# Patient Record
Sex: MALE | Race: WHITE | NOT HISPANIC OR LATINO | Employment: FULL TIME | URBAN - METROPOLITAN AREA
[De-identification: names, ages, dates, MRNs, and addresses within clinical notes are randomized per-mention and may not be internally consistent; named-entity substitution may affect disease eponyms.]

---

## 2018-01-29 ENCOUNTER — OFFICE VISIT - HEALTHEAST (OUTPATIENT)
Dept: FAMILY MEDICINE | Facility: CLINIC | Age: 48
End: 2018-01-29

## 2018-01-29 DIAGNOSIS — K50.90 REGIONAL ENTERITIS (H): ICD-10-CM

## 2018-01-29 DIAGNOSIS — Z01.818 PREOPERATIVE EXAMINATION: ICD-10-CM

## 2018-01-29 DIAGNOSIS — S86.812A PATELLAR TENDON RUPTURE, LEFT, INITIAL ENCOUNTER: ICD-10-CM

## 2018-01-29 DIAGNOSIS — I45.6 ANOMALOUS ATRIOVENTRICULAR EXCITATION: ICD-10-CM

## 2018-01-29 LAB — HGB BLD-MCNC: 14.5 G/DL (ref 14–18)

## 2018-01-29 ASSESSMENT — MIFFLIN-ST. JEOR: SCORE: 1763.04

## 2018-02-12 ENCOUNTER — OFFICE VISIT - HEALTHEAST (OUTPATIENT)
Dept: FAMILY MEDICINE | Facility: CLINIC | Age: 48
End: 2018-02-12

## 2018-02-12 DIAGNOSIS — Z00.00 ROUTINE GENERAL MEDICAL EXAMINATION AT A HEALTH CARE FACILITY: ICD-10-CM

## 2018-02-12 DIAGNOSIS — I45.6 ANOMALOUS ATRIOVENTRICULAR EXCITATION: ICD-10-CM

## 2018-02-12 DIAGNOSIS — K50.90 CROHN'S DISEASE (H): ICD-10-CM

## 2018-02-12 LAB
CHOLEST SERPL-MCNC: 228 MG/DL
FASTING STATUS PATIENT QL REPORTED: YES
FASTING STATUS PATIENT QL REPORTED: YES
GLUCOSE BLD-MCNC: 88 MG/DL (ref 70–125)
HDLC SERPL-MCNC: 56 MG/DL
LDLC SERPL CALC-MCNC: 135 MG/DL
TRIGL SERPL-MCNC: 184 MG/DL

## 2018-02-12 ASSESSMENT — MIFFLIN-ST. JEOR: SCORE: 1785.72

## 2018-02-18 ENCOUNTER — COMMUNICATION - HEALTHEAST (OUTPATIENT)
Dept: FAMILY MEDICINE | Facility: CLINIC | Age: 48
End: 2018-02-18

## 2018-02-22 ENCOUNTER — HOSPITAL ENCOUNTER (OUTPATIENT)
Dept: CARDIOLOGY | Facility: CLINIC | Age: 48
Discharge: HOME | End: 2018-02-22
Attending: FAMILY MEDICINE

## 2018-02-22 DIAGNOSIS — I45.6 ANOMALOUS ATRIOVENTRICULAR EXCITATION: ICD-10-CM

## 2018-02-22 LAB
AORTIC ROOT: 3.6 CM
BSA FOR ECHO PROCEDURE: 2.11 M2
CV BLOOD PRESSURE: NORMAL MMHG
CV ECHO HEIGHT: 71 IN
CV ECHO WEIGHT: 197 LBS
DOP CALC LVOT AREA: 3.46 CM2
DOP CALC LVOT DIAMETER: 2.1 CM
DOP CALC LVOT PEAK VEL: 101 CM/S
DOP CALC LVOT STROKE VOLUME: 70.3 CM3
DOP CALCLVOT PEAK VEL VTI: 20.3 CM
EJECTION FRACTION: 65 % (ref 55–75)
FRACTIONAL SHORTENING: 35.3 % (ref 28–44)
INTERVENTRICULAR SEPTUM IN END DIASTOLE: 0.9 CM (ref 0.6–1)
IVS/PW RATIO: 1
LA AREA 1: 12.5 CM2
LA AREA 2: 13.3 CM2
LEFT ATRIUM LENGTH: 4.22 CM
LEFT ATRIUM SIZE: 4 CM
LEFT ATRIUM TO AORTIC ROOT RATIO: 1.08 NO UNITS
LEFT ATRIUM VOLUME INDEX: 15.9 ML/M2
LEFT ATRIUM VOLUME: 33.5 ML
LEFT VENTRICLE CARDIAC INDEX: 2 L/MIN/M2
LEFT VENTRICLE CARDIAC OUTPUT: 4.2 L/MIN
LEFT VENTRICLE DIASTOLIC VOLUME INDEX: 37.9 CM3/M2 (ref 34–74)
LEFT VENTRICLE DIASTOLIC VOLUME: 80 CM3 (ref 62–150)
LEFT VENTRICLE HEART RATE: 60 BPM
LEFT VENTRICLE MASS INDEX: 52.4 G/M2
LEFT VENTRICLE SYSTOLIC VOLUME INDEX: 13.3 CM3/M2 (ref 11–31)
LEFT VENTRICLE SYSTOLIC VOLUME: 28 CM3 (ref 21–61)
LEFT VENTRICULAR INTERNAL DIMENSION IN DIASTOLE: 4.02 CM (ref 4.2–5.8)
LEFT VENTRICULAR INTERNAL DIMENSION IN SYSTOLE: 2.6 CM (ref 2.5–4)
LEFT VENTRICULAR MASS: 110.6 G
LEFT VENTRICULAR OUTFLOW TRACT MEAN GRADIENT: 2 MMHG
LEFT VENTRICULAR OUTFLOW TRACT MEAN VELOCITY: 60.7 CM/S
LEFT VENTRICULAR OUTFLOW TRACT PEAK GRADIENT: 4 MMHG
LEFT VENTRICULAR POSTERIOR WALL IN END DIASTOLE: 0.9 CM (ref 0.6–1)
LV STROKE VOLUME INDEX: 33.3 ML/M2
MITRAL VALVE E/A RATIO: 1.3
MV AVERAGE E/E' RATIO: 5.5 CM/S
MV DECELERATION TIME: 370 MS
MV E'TISSUE VEL-LAT: 11 CM/S
MV E'TISSUE VEL-MED: 8.58 CM/S
MV LATERAL E/E' RATIO: 4.9
MV MEDIAL E/E' RATIO: 6.3
MV PEAK A VELOCITY: 40.5 CM/S
MV PEAK E VELOCITY: 53.8 CM/S
NUC REST DIASTOLIC VOLUME INDEX: 3152 LBS
NUC REST SYSTOLIC VOLUME INDEX: 71 IN
TRICUSPID VALVE ANULAR PLANE SYSTOLIC EXCURSION: 2.8 CM

## 2018-02-22 ASSESSMENT — MIFFLIN-ST. JEOR: SCORE: 1785.72

## 2018-02-26 ENCOUNTER — COMMUNICATION - HEALTHEAST (OUTPATIENT)
Dept: FAMILY MEDICINE | Facility: CLINIC | Age: 48
End: 2018-02-26

## 2018-03-08 ENCOUNTER — COMMUNICATION - HEALTHEAST (OUTPATIENT)
Dept: FAMILY MEDICINE | Facility: CLINIC | Age: 48
End: 2018-03-08

## 2018-06-21 ENCOUNTER — RECORDS - HEALTHEAST (OUTPATIENT)
Dept: ADMINISTRATIVE | Facility: OTHER | Age: 48
End: 2018-06-21

## 2018-10-05 ENCOUNTER — RECORDS - HEALTHEAST (OUTPATIENT)
Dept: ADMINISTRATIVE | Facility: OTHER | Age: 48
End: 2018-10-05

## 2020-02-04 ENCOUNTER — RECORDS - HEALTHEAST (OUTPATIENT)
Dept: ADMINISTRATIVE | Facility: OTHER | Age: 50
End: 2020-02-04

## 2020-02-13 ENCOUNTER — OFFICE VISIT - HEALTHEAST (OUTPATIENT)
Dept: FAMILY MEDICINE | Facility: CLINIC | Age: 50
End: 2020-02-13

## 2020-02-13 DIAGNOSIS — H61.23 BILATERAL IMPACTED CERUMEN: ICD-10-CM

## 2020-02-13 DIAGNOSIS — H60.391 OTHER INFECTIVE ACUTE OTITIS EXTERNA OF RIGHT EAR: ICD-10-CM

## 2020-02-13 RX ORDER — IBUPROFEN 200 MG
200 TABLET ORAL EVERY 6 HOURS PRN
Status: ON HOLD | COMMUNITY
Start: 2020-02-13 | End: 2023-07-21

## 2020-02-18 ENCOUNTER — OFFICE VISIT - HEALTHEAST (OUTPATIENT)
Dept: FAMILY MEDICINE | Facility: CLINIC | Age: 50
End: 2020-02-18

## 2020-02-18 DIAGNOSIS — H65.04 RECURRENT ACUTE SEROUS OTITIS MEDIA OF RIGHT EAR: ICD-10-CM

## 2020-02-18 DIAGNOSIS — H69.91 DYSFUNCTION OF RIGHT EUSTACHIAN TUBE: ICD-10-CM

## 2020-08-18 ENCOUNTER — OFFICE VISIT - HEALTHEAST (OUTPATIENT)
Dept: FAMILY MEDICINE | Facility: CLINIC | Age: 50
End: 2020-08-18

## 2020-08-18 DIAGNOSIS — H60.312 ACUTE DIFFUSE OTITIS EXTERNA OF LEFT EAR: ICD-10-CM

## 2020-10-31 ENCOUNTER — VIRTUAL VISIT (OUTPATIENT)
Dept: FAMILY MEDICINE | Facility: OTHER | Age: 50
End: 2020-10-31

## 2020-11-01 ENCOUNTER — RECORDS - HEALTHEAST (OUTPATIENT)
Dept: ADMINISTRATIVE | Facility: OTHER | Age: 50
End: 2020-11-01

## 2020-11-02 NOTE — PROGRESS NOTES
"Date: 10/31/2020 16:51:43  Clinician: Mary Alice Shelton  Clinician NPI: [id number]  Patient: Emiliano Kelsey  Patient : 1970  Patient Address: [address] [place], [place] [id number]  Patient Phone: [phone]  Visit Protocol: URI  Patient Summary:  Emiliano is a 50 year old ( : 1970 ) male who initiated a [facility] Visit for COVID-19 (Coronavirus) evaluation and screening. When asked the question \"Please sign me up to receive news, health information and promotions. \", Emiliano responded \"Yes\".    When asked when his symptoms started, Emiliano reported that he does not have any symptoms.   He denies having recent facial or sinus surgery in the past 60 days and taking antibiotic medication in the past month.    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 In the past 14 days, Emiliano has worked or volunteered at a long term care facility, a group home, a nursing home, an assisted living, a hospital or a clinic, and a healthcare facility or a group living setting. Additional job details as reported by the patient (free text): [employer]   In the past 14 days, he has not lived in a congregate living setting.   Emiliano has had a close contact with a laboratory-confirmed COVID-19 patient in the last 14 days. He was not exposed at his work. Date Emiliano was exposed to the laboratory-confirmed COVID-19 patient: 10/26/2020   Additional information about contact with COVID-19 (Coronavirus) patient as reported by the patient (free text): , exposed to positive student   Emiliano is not living in the same household with the COVID-19 positive patient. He was in an enclosed space for greater than 15 minutes with the COVID-19 patient.   During the encounter, both of them were wearing masks.   Since 2019, Emiliano has not been diagnosed with lab-confirmed COVID-19 test and has not had upper " respiratory infection or influenza-like illness.   Pertinent medical history  Emiliano does not need a return to work/school note.   Weight: 195 lbs   Emiliano does not smoke or use smokeless tobacco.   Weight: 195 lbs    MEDICATIONS: No current medications, ALLERGIES: Tylenol-Codeine #3  Clinician Response:  Dear Emiliano,   Based on your exposure to COVID-19 (coronavirus), we would like to test you for this virus.  1. Please call [phone] to schedule your visit. Explain that you were referred by [facility] to have a COVID-19 test. Be ready [facility].   The following will serve as your written order for this COVID Test, ordered by me, for the indication of suspected COVID [Z20.828]: The test will be ordered in [facility], our electronic health record, after you are scheduled. It will show as ordered and authorized by Leif Walsh MD.  Order: COVID-19 (coronavirus) PCR for ASYMPTOMATIC EXPOSURE testing from [facility].   If you know you have had close contact with someone who tested positive, you should be quarantined for 14 days after this exposure. You should stay in quarantine for the14 days even if the covid test is negative, the optimal time to test after exposure is 5-7 days from the exposure  Quarantine means   What should I do?  For safety, it's very important to follow these rules. Do this for 14 days after the date you were last exposed to the virus..  Stay home and away from others. Don't go to school or anywhere else. Generally quarantine means staying home from work but there are some exceptions to this. Please contact your workplace.   No hugging, kissing or shaking hands.  Don't let anyone visit.  Cover your mouth and nose with a mask, tissue or washcloth to avoid spreading germs.  Wash your hands and face often. Use soap and water.  What are the symptoms of COVID-19?  The most common symptoms are cough, fever and trouble breathing. Less common symptoms include headache, body aches, fatigue  (feeling very tired), chills, sore throat, stuffy or runny nose, diarrhea (loose poop), loss of taste or smell, belly pain, and nausea or vomiting (feeling sick to your stomach or throwing up).  After 14 days, if you have still don't have symptoms, you likely don't have this virus.  If you develop symptoms, follow these guidelines.  If you're normally healthy: Please start another [facility] visit to report your symptoms. Go to [email or web address].  If you have a serious health problem (like cancer, heart failure, an organ transplant or kidney disease): Call your specialty clinic. Let them know that you might have COVID-19.  2. When it's time for your COVID test:  Stay at least 6 feet away from others. (If someone will drive you to your test, stay in the backseat, as far away from the  as you can.)  Cover your mouth and nose with a mask, tissue or washcloth.  Go straight to the testing site. Don't make any stops on the way there or back.  Please note  Caregivers in these groups are at risk for severe illness due to COVID-19:  o People 65 years and older  o People who live in a nursing home or long-term care facility  o People with chronic disease (lung, heart, cancer, diabetes, kidney, liver, immunologic)  o People who have a weakened immune system, including those who:  Are in cancer treatment  Take medicine that weakens the immune system, such as corticosteroids  Had a bone marrow or organ transplant  Have an immune deficiency  Have poorly controlled HIV or AIDS  Are obese (body mass index of 40 or higher)  Smoke regularly  Where can I get more information?   [facility] -- About COVID-19: [email or web address]  CDC -- What to Do If You're Sick: www.cdc.gov/coronavirus/2019-ncov/about/steps-when-sick.html  CDC -- Ending Home Isolation: www.cdc.gov/coronavirus/2019-ncov/hcp/disposition-in-home-patients.html  CDC -- Caring for Someone: www.cdc.gov/coronavirus/2019-ncov/if-you-are-sick/care-for-someone.html   [facility] -- Interim Guidance for Hospital Discharge to Home: www.health.[facility].mn.us/diseases/coronavirus/hcp/hospdischarge.pdf  [facility] clinical trials (COVID-19 research studies): clinicalaffairs.[facility]/n-clinical-trials  Below are the COVID-19 hotlines at the [place] Department of Health ([facility]). Interpreters are available.  For health questions: Call 931-410-2658 or 1-424.783.7475 (7 a.m. to 7 p.m.)  For questions about schools and childcare: Call 849-385-0237 or 1-323.945.2190 (7 a.m. to 7 p.m.)    Diagnosis: Contact with and (suspected) exposure to other viral communicable diseases  Diagnosis ICD: Z20.828

## 2021-03-25 ENCOUNTER — OFFICE VISIT - HEALTHEAST (OUTPATIENT)
Dept: FAMILY MEDICINE | Facility: CLINIC | Age: 51
End: 2021-03-25

## 2021-03-25 DIAGNOSIS — R10.84 ABDOMINAL PAIN, GENERALIZED: ICD-10-CM

## 2021-03-25 LAB
ALBUMIN SERPL-MCNC: 4.6 G/DL (ref 3.5–5)
ALP SERPL-CCNC: 76 U/L (ref 45–120)
ALT SERPL W P-5'-P-CCNC: 33 U/L (ref 0–45)
ANION GAP SERPL CALCULATED.3IONS-SCNC: 11 MMOL/L (ref 5–18)
AST SERPL W P-5'-P-CCNC: 22 U/L (ref 0–40)
BILIRUB SERPL-MCNC: 0.6 MG/DL (ref 0–1)
BUN SERPL-MCNC: 18 MG/DL (ref 8–22)
CALCIUM SERPL-MCNC: 9.3 MG/DL (ref 8.5–10.5)
CHLORIDE BLD-SCNC: 103 MMOL/L (ref 98–107)
CO2 SERPL-SCNC: 25 MMOL/L (ref 22–31)
CREAT SERPL-MCNC: 1.07 MG/DL (ref 0.7–1.3)
ERYTHROCYTE [DISTWIDTH] IN BLOOD BY AUTOMATED COUNT: 11.6 % (ref 11–14.5)
GFR SERPL CREATININE-BSD FRML MDRD: >60 ML/MIN/1.73M2
GLUCOSE BLD-MCNC: 120 MG/DL (ref 70–125)
HCT VFR BLD AUTO: 39.9 % (ref 40–54)
HGB BLD-MCNC: 13.9 G/DL (ref 14–18)
MCH RBC QN AUTO: 31.7 PG (ref 27–34)
MCHC RBC AUTO-ENTMCNC: 34.8 G/DL (ref 32–36)
MCV RBC AUTO: 91 FL (ref 80–100)
PLATELET # BLD AUTO: 200 THOU/UL (ref 140–440)
PMV BLD AUTO: 7.7 FL (ref 7–10)
POTASSIUM BLD-SCNC: 4.1 MMOL/L (ref 3.5–5)
PROT SERPL-MCNC: 7.4 G/DL (ref 6–8)
RBC # BLD AUTO: 4.38 MILL/UL (ref 4.4–6.2)
SODIUM SERPL-SCNC: 139 MMOL/L (ref 136–145)
WBC: 4.6 THOU/UL (ref 4–11)

## 2021-03-25 ASSESSMENT — MIFFLIN-ST. JEOR: SCORE: 1784.81

## 2021-03-29 ENCOUNTER — HOSPITAL ENCOUNTER (OUTPATIENT)
Dept: CT IMAGING | Facility: HOSPITAL | Age: 51
Discharge: HOME | End: 2021-03-29

## 2021-03-29 DIAGNOSIS — R10.84 ABDOMINAL PAIN, GENERALIZED: ICD-10-CM

## 2021-03-30 ENCOUNTER — AMBULATORY - HEALTHEAST (OUTPATIENT)
Dept: FAMILY MEDICINE | Facility: CLINIC | Age: 51
End: 2021-03-30

## 2021-03-30 ENCOUNTER — COMMUNICATION - HEALTHEAST (OUTPATIENT)
Dept: FAMILY MEDICINE | Facility: CLINIC | Age: 51
End: 2021-03-30

## 2021-03-30 ENCOUNTER — COMMUNICATION - HEALTHEAST (OUTPATIENT)
Dept: ADMINISTRATIVE | Facility: CLINIC | Age: 51
End: 2021-03-30

## 2021-03-30 DIAGNOSIS — K76.9 LIVER LESION: ICD-10-CM

## 2021-03-31 ENCOUNTER — HOSPITAL ENCOUNTER (OUTPATIENT)
Dept: ULTRASOUND IMAGING | Facility: CLINIC | Age: 51
Discharge: HOME | End: 2021-03-31

## 2021-03-31 DIAGNOSIS — K76.9 LIVER LESION: ICD-10-CM

## 2021-04-08 ENCOUNTER — RECORDS - HEALTHEAST (OUTPATIENT)
Dept: ADMINISTRATIVE | Facility: OTHER | Age: 51
End: 2021-04-08

## 2021-04-21 ENCOUNTER — RECORDS - HEALTHEAST (OUTPATIENT)
Dept: SCHEDULING | Facility: CLINIC | Age: 51
End: 2021-04-21

## 2021-04-21 ENCOUNTER — RECORDS - HEALTHEAST (OUTPATIENT)
Dept: ADMINISTRATIVE | Facility: OTHER | Age: 51
End: 2021-04-21

## 2021-04-21 DIAGNOSIS — Z71.3 DIETARY COUNSELING AND SURVEILLANCE: ICD-10-CM

## 2021-04-21 DIAGNOSIS — R03.0 ELEVATED BLOOD-PRESSURE READING WITHOUT DIAGNOSIS OF HYPERTENSION: ICD-10-CM

## 2021-04-21 DIAGNOSIS — K50.10 CROHN'S DISEASE OF LARGE INTESTINE WITHOUT COMPLICATION (H): ICD-10-CM

## 2021-04-21 DIAGNOSIS — R10.9 RIGHT SIDED ABDOMINAL PAIN: ICD-10-CM

## 2021-05-24 ENCOUNTER — RECORDS - HEALTHEAST (OUTPATIENT)
Dept: ADMINISTRATIVE | Facility: CLINIC | Age: 51
End: 2021-05-24

## 2021-05-25 ENCOUNTER — RECORDS - HEALTHEAST (OUTPATIENT)
Dept: ADMINISTRATIVE | Facility: CLINIC | Age: 51
End: 2021-05-25

## 2021-05-31 ENCOUNTER — RECORDS - HEALTHEAST (OUTPATIENT)
Dept: ADMINISTRATIVE | Facility: CLINIC | Age: 51
End: 2021-05-31

## 2021-06-01 VITALS — WEIGHT: 197 LBS | BODY MASS INDEX: 27.58 KG/M2 | HEIGHT: 71 IN

## 2021-06-01 VITALS — HEIGHT: 71 IN | BODY MASS INDEX: 26.88 KG/M2 | WEIGHT: 192 LBS

## 2021-06-01 VITALS — WEIGHT: 197 LBS | HEIGHT: 71 IN | BODY MASS INDEX: 27.58 KG/M2

## 2021-06-04 VITALS
DIASTOLIC BLOOD PRESSURE: 78 MMHG | SYSTOLIC BLOOD PRESSURE: 138 MMHG | WEIGHT: 200 LBS | BODY MASS INDEX: 27.89 KG/M2 | HEART RATE: 66 BPM | RESPIRATION RATE: 17 BRPM | OXYGEN SATURATION: 96 % | TEMPERATURE: 97.7 F

## 2021-06-04 VITALS
DIASTOLIC BLOOD PRESSURE: 68 MMHG | TEMPERATURE: 97.5 F | SYSTOLIC BLOOD PRESSURE: 120 MMHG | BODY MASS INDEX: 28.22 KG/M2 | HEART RATE: 86 BPM | WEIGHT: 202.3 LBS | OXYGEN SATURATION: 98 %

## 2021-06-04 VITALS
SYSTOLIC BLOOD PRESSURE: 130 MMHG | DIASTOLIC BLOOD PRESSURE: 80 MMHG | HEART RATE: 72 BPM | OXYGEN SATURATION: 98 % | BODY MASS INDEX: 27.48 KG/M2 | TEMPERATURE: 98.3 F | RESPIRATION RATE: 16 BRPM | WEIGHT: 197 LBS

## 2021-06-05 VITALS
HEIGHT: 71 IN | SYSTOLIC BLOOD PRESSURE: 112 MMHG | DIASTOLIC BLOOD PRESSURE: 72 MMHG | WEIGHT: 196.8 LBS | BODY MASS INDEX: 27.55 KG/M2 | HEART RATE: 72 BPM

## 2021-06-06 NOTE — PROGRESS NOTES
Assessment/Plan:    1. Recurrent acute serous otitis media of right ear  2.  Eustachian tube dysfunction, right  Patient's ear canal appears to be healing.  He still has significant moderate pain internally in his right ear with some discomfort radiating down into his right side of his jaw.  He has some erythema of the TM otherwise, no overt sign of infection today.  Patient had some relief after finishing amoxicillin but symptoms have since worsened again.  In light of persisting symptoms along with upcoming travel, will provide prescription for cefdinir 300 mg twice daily for 10 days.  We discussed taking a decongestant such as Sudafed to help relieve pressure in the ear.  I will place a referral for ENT in the event that symptoms do not improve with second round of oral antibiotics.  We discussed red flag symptoms that would warrant prompt evaluation.  - cefdinir (OMNICEF) 300 MG capsule; Take 1 capsule (300 mg total) by mouth 2 (two) times a day for 10 days.  Dispense: 20 capsule; Refill: 0  -Ambulatory referral to ENT      Subjective:    Emiliano Kelsey is a 49 year old male seen today for follow up of recent [facility] visit.  Patient had issues with right-sided ear pain for the last 3 weeks or so.  [facility] end of January, 2019.  He reports having symptoms of sore throat, cough and fever at that time.  A few days later he developed right-sided ear pain.  He was treated with amoxicillin and ofloxacin eardrops.  States that symptoms seem to be getting better initially however after finishing antibiotic symptoms started to take a turn for the worse again.  He then was evaluated in walk-in care last week.  Was prescribed Ciprodex eardrops for management of external ear infection.  Patient states that he has not noticed much improvement since starting these eardrops.  He feels that the pain is more internal and is starting to have some discomfort radiating down to the right side of his  jaw.  He denies any persistent fevers.  No drainage from the right ear.  His left ear is asymptomatic.  Patient states long history of eustachian tube dysfunction in the right ear.  He has some ringing in his right ear.  No change in hearing otherwise.  Has a very difficult time with flying.  [place].  Review of systems is as stated in HPI, and the remainder of the 10 system review is otherwise unremarkable.    Past Medical History, Family History, and Social History reviewed.    Past Surgical History:   Procedure Laterality Date     GA APPENDECTOMY      Description: Appendectomy;  Recorded: 12/09/2008;     GA REMOVAL OF TONSILS,<13 Y/O      Description: Tonsillectomy;  Recorded: 12/09/2008;     GA REPAIR CRUCIATE LIGAMENT,KNEE      Description: Primary Repair Of Knee Ligament Cruciate Anterior;  Recorded: 12/09/2008;        No family history on file.     Past Medical History:   Diagnosis Date     Crohn's Disease     Created by Conversion      Opal-Parkinson-White Syndrome     Created by Conversion         Social History     Tobacco Use     Smoking status: Never Smoker     Smokeless tobacco: Never Used   Substance Use Topics     Alcohol use: Not on file     Drug use: Not on file        Current Outpatient Medications   Medication Sig Dispense Refill     ciprofloxacin-dexamethasone (CIPRODEX) otic suspension 4 drops in affected ear twice daily x 7 days 7.5 mL 0     ibuprofen (ADVIL,MOTRIN) 200 MG tablet Take 200 mg by mouth every 6 (six) hours as needed for pain.       cefdinir (OMNICEF) 300 MG capsule Take 1 capsule (300 mg total) by mouth 2 (two) times a day for 10 days. 20 capsule 0     No current facility-administered medications for this visit.           Objective:    Vitals:    02/18/20 1529   BP: 120/68   Patient Site: Right Arm   Patient Position: Sitting   Cuff Size: Adult Large   Pulse: 86   Temp: 97.5  F (36.4  C)   SpO2: 98%   Weight: 202 lb 4.8 oz  (91.8 kg)      Body mass index is 28.22 kg/m .      General Appearance:  Alert, afebrile, cooperative, no distress, appears stated age   HEENT:   Moist mucous membranes, posterior pharynx is clear without erythema or exudate.  Left tympanic membrane and ear canal appear normal.  Right ear canal is normal, right tympanic membrane is erythematous, nonbulging.  No sinus tenderness or nasal drainage present.   Neck: Supple, symmetrical, no adenopathy.   Lungs:   Clear to auscultation bilaterally, respirations unlabored.    Heart:  Regular rate and rhythm, S1, S2 normal, no murmur, rub or gallop   Skin: Warm, dry.  Skin color, texture, turgor normal, no rashes or lesions           This note has been dictated using voice recognition software. Any grammatical or context distortions are unintentional and inherent to the use of this software.

## 2021-06-10 NOTE — PROGRESS NOTES
URGENT CARE VISIT:    SUBJECTIVE:   Emiliano Kelsey is a 49 y.o. male presenting with a chief complaint of left ear pain.  Onset was 3 week(s) ago. He denies the following symptoms: fever, chills, nasal congestion, sore throat, cough and dyspnea. Course of illness is gradually worsening. Pain radiates to left jaw. Pain is constant. Treatment measures tried include none with no relief of symptoms. Predisposing factors include history of recurrent ear infections and eustachian tube dysfunction. Last time had to go on a few different antibiotics before it resolved.     PMH:   Past Medical History:   Diagnosis Date     Crohn's Disease     Created by Conversion      Opal-Parkinson-White Syndrome     Created by Conversion      Allergies: Codeine   Medications:   Current Outpatient Medications   Medication Sig Dispense Refill     ibuprofen (ADVIL,MOTRIN) 200 MG tablet Take 200 mg by mouth every 6 (six) hours as needed for pain.       amoxicillin (AMOXIL) 500 MG capsule Take 2 capsules (1,000 mg total) by mouth 3 (three) times a day for 7 days. 42 capsule 0     neomycin-polymyxin-hydrocortisone (CORTISPORIN) otic solution Administer 3 drops into the left ear 3 (three) times a day for 10 days. 10 mL 0     No current facility-administered medications for this visit.      Social History:   Social History     Tobacco Use     Smoking status: Never Smoker     Smokeless tobacco: Never Used   Substance Use Topics     Alcohol use: Not on file        ROS:  Review of systems negative except as stated above.    OBJECTIVE:  /80 (Patient Site: Right Arm, Patient Position: Sitting, Cuff Size: Adult Large)   Pulse 72   Temp 98.3  F (36.8  C) (Oral)   Resp 16   Wt 197 lb (89.4 kg)   SpO2 98%   BMI 27.48 kg/m      GENERAL APPEARANCE: healthy, alert and no distress  EYES: conjunctiva clear  HENT: TM's normal.  Left marked tragus ttp. Mild erythema of left external canal. Non-erythematous oropharynx. Uvula midline.   NECK: supple,  nontender, no lymphadenopathy  RESP: lungs clear to auscultation - no rales, rhonchi or wheezes  CV: regular rate and rhythm, normal S1 S2, no murmur noted  SKIN: no suspicious lesions or rashes    Labs:    Results for orders placed or performed during the hospital encounter of 02/22/18   Echo Complete   Result Value Ref Range    BSA 2.11 m2    Jake 71 in    Weight 3,152 lbs    /64 mmHg    HR 60 bpm    LV volume diastolic 80 62 - 150 cm3    LV volume systolic 28 21 - 61 cm3    IVSd 0.9 0.6 - 1.0 cm    LVIDd 4.02 4.2 - 5.8 cm    LVIDs 2.6 2.5 - 4.0 cm    LVOT diam 2.1 cm    LVOT mean gradient 2 mmHg    LVOT peak VTI 20.3 cm    LVOT mean radha 60.7 cm/s    LVOT peak radha 101 cm/s    LVOT peak gradient 4 mmHg    LV PWd 0.9 0.6 - 1.0 cm    MV E' lat radha 11 cm/s    MV E' med radha 8.58 cm/s    AO root 3.6 cm    LA size 4 cm    LA/AO root ratio 1.08 no units    MV decel time 370 ms    MV peak A radha 40.5 cm/s    MV peak E radha 53.8 cm/s    LA area 1 12.5 cm2    LA length 4.22 cm    LA area 2 13.3 cm2    TAPSE 2.8 cm    IVS/PW ratio 1.0     LV FS 35.3 28 - 44 %    Echo LVEF calculated 65 55 - 75 %    LA volume 33.5 mL    LV mass 110.6 g    MV E/A Ratio 1.3     LVOT area 3.46 cm2    LVOT SV 70.3 cm3    LV systolic volume index 13.3 11 - 31 cm3/m2    LV diastolic volume index 37.9 34 - 74 cm3/m2    LA volume index 15.9 mL/m2    LV mass index 52.4 g/m2    LV SVi 33.3 ml/m2    MV med E/e' ratio 6.3     MV lat E/e' ratio 4.9     LV CO 4.2 l/min    LV Ci 2.0 l/min/m2    Height 71.0 in    Weight 197 lbs    MV Avg E/e' Ratio 5.5 cm/s        ASSESSMENT:  1. Acute diffuse otitis externa of left ear  neomycin-polymyxin-hydrocortisone (CORTISPORIN) otic solution    amoxicillin (AMOXIL) 500 MG capsule       PLAN:  Patient Instructions   Patient was educated on the natural course of condition. Recurrent OE. Last episode in February was on several oral antibiotics and drops before resolution. They reccommended ENT visit at that time but  COVID hit and that fell through. Apply medication as prescribed. If no improvement in 3 days then start oral antibiotic. Conservative measures discussed including warm compresses and over-the-counter analgesics (Tylenol and Ibuprofen). See your primary care provider if symptoms worsen or do not improve in 5 days. Seek emergency care if you develop severe ear pain, swelling, or redness.     Patient verbalized understanding and is agreeable to plan. The patient was discharged ambulatory and in stable condition.    Ember Hodge ....................  8/18/2020   11:15 AM

## 2021-06-10 NOTE — PATIENT INSTRUCTIONS - HE
Patient was educated on the natural course of condition. Recurrent OE. Last episode in February was on several oral antibiotics and drops before resolution. They reccommended ENT visit at that time but COVID hit and that fell through. Apply medication as prescribed. If no improvement in 3 days then start oral antibiotic. Conservative measures discussed including warm compresses and over-the-counter analgesics (Tylenol and Ibuprofen). See your primary care provider if symptoms worsen or do not improve in 5 days. Seek emergency care if you develop severe ear pain, swelling, or redness.

## 2021-06-15 NOTE — PROGRESS NOTES
Preoperative Exam    Scheduled Procedure: revision of knee tendon repair left knee  Surgery Date:  1/31/18  Surgery Location: [facility], fax [phone]    Surgeon:  Dr Florez    Assessment/Plan:     Emiliano was seen today for pre-op exam.    Preoperative examination  -     Hemoglobin    Patellar tendon rupture, left, initial encounter    Opal-Parkinson-White Syndrome    Crohn's Disease        Surgical Procedure Risk: Low (reported cardiac risk generally < 1%)  Have you had prior anesthesia?: No  Have you or any family members had a previous anesthesia reaction:  No  Do you or any family members have a history of a clotting or bleeding disorder?: No  Cardiac Risk Assessment: opal parkinsons syndrome    Patient approved for surgery with general or local anesthesia.    Please Note:    Functional Status: Independent  Patient plans to recover at home with family.     Subjective:      Emiliano Kelsey is a 47 y.o. male who presents for a preoperative consultation.  He had a patellar tendon rupture in 2016.  Because of ongoing symptoms now requires surgical revision.  Medical history significant for Opal-Parkinson-White syndrome.  Patient states diagnosed 21 years ago.  At the time had intermittent symptoms of tachycardia that caused lightheadedness.  Was evaluated and found to have Opal-Parkinson-White.  Has not been on treatment.  No recent cardiac evaluation.  Is an active otherwise healthy adult who denies any concerning symptoms that could be traced back to his Opal-Parkinson-White.  Discussed obtaining an echocardiogram for long-term surveillance of this condition but do not feel that there is any significant consideration with this particular aspect of his health history related to his upcoming surgery.  Has a history of Crohn's disease.  Is on mesalamine.  Reports good control of symptoms with only occasional digestive system complaints that are not sustained and nothing  current.  No prior history of any significant anesthesia or surgery problems.  No history of blood clots or bleeding disorders.  Denies any symptoms of an acute infection.    All other systems reviewed and are negative, other than those listed in the HPI.    Pertinent History  Do you have difficulty breathing or chest pain after walking up a flight of stairs: No  History of obstructive sleep apnea: No  Steroid use in the last 6 months:   Frequent Aspirin/NSAID use: No  Prior Blood Transfusion: No  Prior Blood Transfusion Reaction:   If for some reason prior to, during or after the procedure, if it is medically indicated, would you be willing to have a blood transfusion?:      Current Outpatient Prescriptions   Medication Sig Dispense Refill     mesalamine (LIALDA) 1.2 gram EC tablet Take 1,200 mg by mouth daily with breakfast.       ASACOL  mg TbEC EC tablet        No current facility-administered medications for this visit.         Allergies   Allergen Reactions     Codeine      Dyspepsia         Patient Active Problem List   Diagnosis     Opal-Parkinson-White Syndrome     Crohn's Disease     Allergies       Past Medical History:   Diagnosis Date     Crohn's Disease     Created by Conversion      Opal-Parkinson-White Syndrome     Created by Conversion        Past Surgical History:   Procedure Laterality Date     AR APPENDECTOMY      Description: Appendectomy;  Recorded: 12/09/2008;     AR REMOVAL OF TONSILS,<13 Y/O      Description: Tonsillectomy;  Recorded: 12/09/2008;     AR REPAIR CRUCIATE LIGAMENT,KNEE      Description: Primary Repair Of Knee Ligament Cruciate Anterior;  Recorded: 12/09/2008;       Social History     Social History     Marital status:      Spouse name: N/A     Number of children: N/A     Years of education: N/A     Occupational History     Not on file.     Social History Main Topics     Smoking status: Never Smoker     Smokeless tobacco: Never Used     Alcohol use Not on file      "Drug use: Not on file     Sexual activity: Not on file     Other Topics Concern     Not on file     Social History Narrative       Patient Care Team:  Floyd Johnson MD as PCP - General (Family Medicine)  Anselmo Florez MD as Physician (Orthopedic Surgery)  Surgeon : DR Florez          Objective:     Vitals:    01/29/18 0819   BP: 112/66   Pulse: 62   SpO2: 98%   Weight: 192 lb (87.1 kg)   Height: 5' 11\" (1.803 m)         Physical Exam:    General Appearance:    Alert, cooperative, no distress   Eyes:    No conjunctival irritation, no scleral icterus       Ears:    Normal TM's and external ear canals, both ears   Throat:   Lips, mucosa, and tongue normal; teeth and gums normal   Neck:   Supple, symmetrical, trachea midline, no adenopathy;        thyroid:  No enlargement/tenderness/nodules   Lungs:     Clear to auscultation bilaterally, respirations unlabored   Heart:    Regular rate and rhythm,  no murmur, rub or gallop   Abdomen:     Soft, non-tender, bowel sounds active all four quadrants,     no masses, no organomegaly   Extremities:   Extremities normal, atraumatic, no cyanosis or edema   Skin:   Skin color, texture, turgor normal, no rashes or lesions   Neurologic:   Normal strength and sensation        There are no Patient Instructions on file for this visit.      Immunization History   Administered Date(s) Administered     DT (pediatric) 06/01/2004     Influenza [id number], 10/30/2009     Influenza, inj, historic,unspecified 10/06/2015, 10/01/2017     Influenza, seasonal,quad inj 36+ mos 10/06/2015     PPD Test 10/06/2015     Pneumo Conj 13-V (2010&after) 03/22/2017     Pneumo Polysac 23-V 01/27/2015     Td, Adult, Absorbed 06/01/2004     Td,adult,historic,unspecified 06/01/2004     Tdap 09/30/2015       Electronically signed by Floyd Johnson MD 01/29/18 7:50 AM  "

## 2021-06-16 NOTE — PROGRESS NOTES
"    Assessment & Plan     Abdominal pain, generalized  Unclear etiology.  There does not appear to be anything emergent going on.  I do not think this is related to his underlying Crohn disease.  CT scan to evaluate for possible hernia or other issue  - CT Abdomen Pelvis Without Oral With Without IV Contrast  - Comprehensive Metabolic Panel  - HM2(CBC w/o Differential)    Patient Instructions   I think it is reasonable to proceed with a CT scan to better understand your abdominal pain.    Basic lab work today.    Follow-up with GI doctor as planned.    Sign up for my chart so we can relay results to you.  If there is something concerning, I will call.    Follow-up with PCP in 3 to 6 months for routine checkup        15 minutes spent on the date of the encounter doing chart review, history and exam, documentation and further activities as noted above       BMI:   Estimated body mass index is 27.45 kg/m  as calculated from the following:    Height as of this encounter: 5' 11\" (1.803 m).    Weight as of this encounter: 196 lb 12.8 oz (89.3 kg).       Return in about 6 months (around 9/25/2021).    Feliberto Pang, [facility]    Subjective   Emiliano Kelsey is 50 y.o. and presents today for the following health issues   HPI   Persistent periumbilical abdominal pain since January, bit about 2.5 months now.  Pain is constant.  Described as a dull ache.  Persistent 2 out of 10.  Worse with palpation to the area.    He is worried about a possible hernia although no prior history of hernias.  At times she will have some right lower quadrant/groin pain although he has had an appendectomy    History of Crohn disease but that has been stable for a few years.  Has follow-up with his GI doctor in about 2 weeks.  No longer using medication for that.    No nausea or vomiting.  No fevers.  Eating and drinking normally.      Review of Systems  negative      Objective    /72 (Patient Site: " "Right Arm, Patient Position: Sitting, Cuff Size: Adult Large)   Pulse 72   Ht 5' 11\" (1.803 m)   Wt 196 lb 12.8 oz (89.3 kg)   BMI 27.45 kg/m    Body mass index is 27.45 kg/m .  Physical Exam  Pain with palpation to the periumbilical area, mostly on the right side.    No hepatosplenomegaly.    Negative inguinal hernia exam bilaterally              "

## 2021-06-16 NOTE — PATIENT INSTRUCTIONS - HE
I think it is reasonable to proceed with a CT scan to better understand your abdominal pain.    Basic lab work today.    Follow-up with GI doctor as planned.    Sign up for my chart so we can relay results to you.  If there is something concerning, I will call.    Follow-up with PCP in 3 to 6 months for routine checkup

## 2021-06-16 NOTE — TELEPHONE ENCOUNTER
Please call the patient and let him know that the CT scan of his abdomen did not reveal any hernias.  However, he does have some lesions on his liver that should be further evaluated with an ultrasound.  I placed an order for the abdominal ultrasound.  Please give him the radiology scheduling number.

## 2021-06-16 NOTE — PROGRESS NOTES
" Patient ID: Emiliano Kelsey is a 47 y.o. male.  /64  Pulse 68  Ht 5' 11\" (1.803 m)  Wt 197 lb (89.4 kg)  SpO2 97%  BMI 27.48 kg/m2    Assessment/Plan:                   Diagnoses and all orders for this visit:    Routine general medical examination at a health care facility  -     Lipid Cascade  -     Glucose; Future  -     Glucose    Opal-Parkinson-White Syndrome    Crohn's disease           DISCUSSION  Obtain fasting labs as noted above.  Referral to cardiology obtain an echocardiogram prior to visit.  Continue to follow with gastroenterology.  Subjective:     HPI    Emiliano Kelsey is a 47-year-old man who is here today for a physical.  He was recently seen for preoperative evaluation at the end of January.  He underwent a arthroscopic procedure on his left knee.  He continues to recover and will start physical therapy tomorrow.  No problems with his surgery.  His medical history significant for diagnosis of Opal-Parkinson-White.  Had experienced a tachyarrhythmia that may have been atrial flutter at the time of his diagnosis.  There are no records to review regarding the details of the diagnosis otherwise.  Patient reports that he has had intermittent bouts of tachyarrhythmia which she has been able to abort himself by using vagal maneuvers.  Reports he still occasionally will get such symptoms however they are short-lived and infrequent and has not prompted any specific medical evaluation.  He has not seen a cardiologist or had a cardiology evaluation for a number of years.  Today we discussed arranging for routine cardiology follow-up to see if there is anything more that should be done in terms of evaluation.  He does also have a history of Crohn's disease is on mesalamine.  Undergoes periodic colonoscopies about every 2-3 years.  His Crohn's disease seems to be in remission and does not report any concerning symptoms.  He is otherwise healthy.  Social and family history are reviewed.  He remains " "physically active.  He does  high school sports including track.  Works as a paramedic.  Does not smoke.  Otherwise overall doing well.      Review of Systems  Complete review of systems is obtained.  Other than the specific considerations noted above complete review of systems is negative.          Objective:   Medications:  Current Outpatient Prescriptions   Medication Sig Note     mesalamine (LIALDA) 1.2 gram EC tablet Take 1,200 mg by mouth daily with breakfast.      ASACOL  mg TbEC EC tablet  10/6/2015: Received from: External Pharmacy       Allergies:  Allergies   Allergen Reactions     Codeine      Dyspepsia         Tobacco:   reports that he has never smoked. He has never used smokeless tobacco.    HEALTH PREVENTION    General  Dental care: Discussed the importance of regular dental care.  Eye care: Discussed importance of routine eye exams for glaucoma screening      Wt Readings from Last 3 Encounters:   02/12/18 197 lb (89.4 kg)   01/29/18 192 lb (87.1 kg)   05/26/16 186 lb (84.4 kg)     Body mass index is 27.48 kg/(m^2).      Cholesterol:   YNo results found for: LDLCALC   Blood Pressure:   BP Readings from Last 3 Encounters:   02/12/18 114/64   01/29/18 112/66   05/26/16 112/66     Immunization History   Administered Date(s) Administered     DT (pediatric) 06/01/2004     Influenza [id number], 10/30/2009     Influenza, inj, historic,unspecified 10/06/2015, 10/01/2017     Influenza, seasonal,quad inj 36+ mos 10/06/2015     PPD Test 10/06/2015     Pneumo Conj 13-V (2010&after) 03/22/2017     Pneumo Polysac 23-V 01/27/2015     Td, Adult, Absorbed 06/01/2004     Td,adult,historic,unspecified 06/01/2004     Tdap 09/30/2015     There are no preventive care reminders to display for this patient.     Physical Exam      /64  Pulse 68  Ht 5' 11\" (1.803 m)  Wt 197 lb (89.4 kg)  SpO2 97%  BMI 27.48 kg/m2    General Appearance:    Alert, cooperative, no distress, appears stated age   Head:    " Normocephalic, without obvious abnormality, atraumatic   Eyes:    PERRL, conjunctiva/corneas clear, EOM's intact       Ears:    Normal TM's and external ear canals, both ears   Nose:   Nares normal, septum midline, mucosa normal, no drainage    or sinus tenderness   Throat:   Lips, mucosa, and tongue normal; teeth and gums normal   Neck:   Supple, symmetrical, trachea midline, no adenopathy;        thyroid:  No enlargement/tenderness/nodules   Lungs:     Clear to auscultation bilaterally, respirations unlabored   Chest wall/ Breast:    No tenderness or deformity   Heart:    Regular rate and rhythm, S1 and S2 normal, no murmur, rub   or gallop   Abdomen:     Soft, non-tender, bowel sounds active all four quadrants,     no masses, no organomegaly   Extremities:   Extremities normal, atraumatic, no cyanosis or edema   Pulses:   2+ and symmetric all extremities   Skin:   Skin color, texture, turgor normal, no rashes or lesions   Neurologic:   CNII-XII intact. Normal strength, sensation and reflexes       throughout

## 2021-06-16 NOTE — TELEPHONE ENCOUNTER
Reason for Call:  Request for results:    Name of test or procedure: CT ABDOMEN    Date of test of procedure: 03/29/2021    Location of the test or procedure: [facility]    OK to leave the result message on voice mail or with a family member? 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 PLEASE CALL WITH NEXT STEPS.     Call taken on 3/30/2021 at 8:09 AM by Mary Glover

## 2021-06-16 NOTE — TELEPHONE ENCOUNTER
Left message to call back for: Emiliano     Information to relay to patient:   Relayed Tez's message below and gave patient the scheduling number for radiology.      Keke Boyce, CMA

## 2021-06-18 NOTE — PATIENT INSTRUCTIONS - HE
Patient Instructions by Jess Lira MD at 2/13/2020  3:00 PM     Author: Jess Lira MD Service: -- Author Type: Physician    Filed: 2/13/2020  4:08 PM Encounter Date: 2/13/2020 Status: Addendum    : Jess Lira MD (Physician)    Related Notes: Original Note by Jess Lira MD (Physician) filed at 2/13/2020  4:07 PM       1. Make an appointment for follow up with primary care provider  2. If symptoms are getting worse over time or you have any questions, call the clinic number - it's answered 24/7      Patient Education     External Ear Infection (Adult)    External otitis (also called swimmers ear) is an infection in the ear canal. It is often caused by bacteria or fungus. It can occur a few days after water gets trapped in the ear canal (from swimming or bathing). It can also occur after cleaning too deeply in the ear canal with a cotton swab or other object. Sometimes, hair care products get into the ear canal and cause this problem.  Symptoms can include pain, fever, itching, redness, drainage, or swelling of the ear canal. Temporary hearing loss may also occur.  Home care    Do not try to clean the ear canal. This can push pus and bacteria deeper into the canal.    Use prescribed ear drops as directed. These help reduce swelling and fight the infection. If an ear wick was placed in the ear canal, apply drops right onto the end of the wick. The wick will draw the medicine into the ear canal even if it is swollen closed.    A cotton ball may be loosely placed in the outer ear to absorb any drainage.    You may use acetaminophen or ibuprofen to control pain, unless another medicine was prescribed. Note: If you have chronic liver or kidney disease or ever had a stomach ulcer or GI bleeding, talk to your healthcare provider before taking any of these medicines.    Do not allow water to get into your ear when bathing. Also, don't swim until the infection has cleared.  Prevention    Keep  your ears dry. This helps lower the risk of infection. Dry your ears with a towel or hair dryer after getting wet. Also, use ear plugs when swimming.    Do not stick any objects in the ear to remove wax.    If you feel water trapped in your ear, use ear drops right away. You can get these drops over the counter at most drugstores. They work by removing water from the ear canal.  Follow-up care  Follow up with your healthcare provider in 1 week, or as advised.  When to seek medical advice  Call your healthcare provider right away if any of these occur:    Ear pain becomes worse or doesnt improve after 3 days of treatment    Redness or swelling of the outer ear occurs or gets worse    Headache    Painful or stiff neck    Drowsiness or confusion    Fever of 100.4 F (38 C) or higher, or as directed by your healthcare provider    Seizure  Date Last Reviewed: 10/1/2017    4343-0629 [employer]. [address] [place] [id number]. All rights reserved. This information is not intended as a substitute for professional medical care. Always follow your healthcare professional's instructions.

## 2021-06-28 NOTE — PROGRESS NOTES
Progress Notes by Jess Lira MD at 2/13/2020  3:00 PM     Author: Jess Lira MD Service: -- Author Type: Physician    Filed: 2/13/2020  4:14 PM Encounter Date: 2/13/2020 Status: Signed    : Jess Lira MD (Physician)         Subjective:   Emiliano Kelsey is a 49 y.o. male  Roomed by: SHANIKA Ambriz    Refills needed? No    Do you have any forms that need to be filled out? No      Chief Complaint   Patient presents with   ? sick x 3 weeks   ? diagnose ear infection / finish antibiotic and ear drops for   ? still have Ear Pain   Says he had started getting ill on 1/30 and a temp up to 102.5. Says at that time he was coughing, nasal congestion and sore throat. Says he went to the [facility] on 2/4 for right ear infectionGiven amoxicillin for 7 days and and ofloxacin antibiotic ear drops. Says his right ear is not as painful, but still feels pain and very plugged. Says he has a history of narrow eustachian tubes. Denies any coughing, fevers, chills, CP or shortness of breath. Energy level has been back to normal and has been working. 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   Review of Systems  See HPI for ROS, otherwise balance of other systems negative    Allergies   Allergen Reactions   ? Codeine      Dyspepsia         Current Outpatient Medications:   ?  ibuprofen (ADVIL,MOTRIN) 200 MG tablet, Take 200 mg by mouth every 6 (six) hours as needed for pain., Disp: , Rfl:   Patient Active Problem List   Diagnosis   ? Opal-Parkinson-White Syndrome   ? Crohn's Disease   ? Allergies   ? Crohn's disease (H)     Past Medical History:   Diagnosis Date   ? Crohn's Disease     Created by Conversion    ? Opal-Parkinson-White Syndrome     Created by Conversion     - if none on file, see Problem List    Objective:     Vitals:    02/13/20 1507 02/13/20 1511   BP: 148/77 138/78   Pulse: 66    Resp: 17    Temp: 97.7  F (36.5  C)    TempSrc: Oral     SpO2: 96%    Weight: 200 lb (90.7 kg)    Gen - Pt in NAD  Eyes - Conjunctiva non injected, no drainage  Face - non TTP over frontal sinus areas; non TTP over maxillary areas  Ears - Right external canals -very indurated with TTP with tugging on right ear lobe  Left external ear canal is without any induration;  Both TM's mostly occluded 2nd to cerumen   Nose - not congested, no nasal drainage  Pharynx - not injected, tonsils 1+ size  Neck - supple, no cervical adenopathy, no masses  Cor - RRR w/o murmur  Lungs - Good air entry; no wheezes or crackles noted on auscultation - no coughing noted  Skin - no lesions, no rashes noted     Assessment - Plan   Medical Decision Making -phoned patient's pharmacy and was informed that his initial eardrop was ofloxacin.  Was able to visualize both TMs after ear irrigation and both TMs are noninjected.  Will treat with Ciprodex for 7 days and have patient follow-up on with primary on 2/18 for another ear check.    1. Other infective acute otitis externa of right ear  - ciprofloxacin-dexamethasone (CIPRODEX) otic suspension; 4 drops in affected ear twice daily x 7 days  Dispense: 7.5 mL; Refill: 0    2. Bilateral impacted cerumen  - Ear wax removal      At the conclusion of the encounter, assessment and plan were discussed.   All questions were answered.   The patient or guardian acknowledged understanding and was involved in the decision making regarding the overall care plan.    Patient Instructions   1. Make an appointment for follow up with primary care provider  2. If symptoms are getting worse over time or you have any questions, call the clinic number - it's answered 24/7      Patient Education     External Ear Infection (Adult)    External otitis (also called swimmers ear) is an infection in the ear canal. It is often caused by bacteria or fungus. It can occur a few days after water gets trapped in the ear canal (from swimming or bathing). It can also occur after cleaning  too deeply in the ear canal with a cotton swab or other object. Sometimes, hair care products get into the ear canal and cause this problem.  Symptoms can include pain, fever, itching, redness, drainage, or swelling of the ear canal. Temporary hearing loss may also occur.  Home care    Do not try to clean the ear canal. This can push pus and bacteria deeper into the canal.    Use prescribed ear drops as directed. These help reduce swelling and fight the infection. If an ear wick was placed in the ear canal, apply drops right onto the end of the wick. The wick will draw the medicine into the ear canal even if it is swollen closed.    A cotton ball may be loosely placed in the outer ear to absorb any drainage.    You may use acetaminophen or ibuprofen to control pain, unless another medicine was prescribed. Note: If you have chronic liver or kidney disease or ever had a stomach ulcer or GI bleeding, talk to your healthcare provider before taking any of these medicines.    Do not allow water to get into your ear when bathing. Also, don't swim until the infection has cleared.  Prevention    Keep your ears dry. This helps lower the risk of infection. Dry your ears with a towel or hair dryer after getting wet. Also, use ear plugs when swimming.    Do not stick any objects in the ear to remove wax.    If you feel water trapped in your ear, use ear drops right away. You can get these drops over the counter at most drugstores. They work by removing water from the ear canal.  Follow-up care  Follow up with your healthcare provider in 1 week, or as advised.  When to seek medical advice  Call your healthcare provider right away if any of these occur:    Ear pain becomes worse or doesnt improve after 3 days of treatment    Redness or swelling of the outer ear occurs or gets worse    Headache    Painful or stiff neck    Drowsiness or confusion    Fever of 100.4 F (38 C) or higher, or as directed by your healthcare  provider    Seizure  Date Last Reviewed: 10/1/2017    0254-3039 [employer]. [address] [place] [address], [place] [address]. All rights reserved. This information is not intended as a substitute for professional medical care. Always follow your healthcare professional's instructions.

## 2021-07-25 ENCOUNTER — HEALTH MAINTENANCE LETTER (OUTPATIENT)
Age: 51
End: 2021-07-25

## 2021-09-19 ENCOUNTER — HEALTH MAINTENANCE LETTER (OUTPATIENT)
Age: 51
End: 2021-09-19

## 2021-12-14 ENCOUNTER — APPOINTMENT (OUTPATIENT)
Dept: FAMILY MEDICINE | Facility: CLINIC | Age: 51
End: 2021-12-14

## 2021-12-14 ENCOUNTER — LAB REQUISITION (OUTPATIENT)
Dept: LAB | Facility: CLINIC | Age: 51
End: 2021-12-14

## 2021-12-14 LAB — SARS-COV-2 RNA RESP QL NAA+PROBE: POSITIVE

## 2021-12-14 PROCEDURE — U0003 INFECTIOUS AGENT DETECTION BY NUCLEIC ACID (DNA OR RNA); SEVERE ACUTE RESPIRATORY SYNDROME CORONAVIRUS 2 (SARS-COV-2) (CORONAVIRUS DISEASE [COVID-19]), AMPLIFIED PROBE TECHNIQUE, MAKING USE OF HIGH THROUGHPUT TECHNOLOGIES AS DESCRIBED BY CMS-2020-01-R: HCPCS | Performed by: INTERNAL MEDICINE

## 2022-08-21 ENCOUNTER — HEALTH MAINTENANCE LETTER (OUTPATIENT)
Age: 52
End: 2022-08-21

## 2022-11-21 ENCOUNTER — HEALTH MAINTENANCE LETTER (OUTPATIENT)
Age: 52
End: 2022-11-21

## 2023-05-22 ENCOUNTER — TRANSFERRED RECORDS (OUTPATIENT)
Dept: HEALTH INFORMATION MANAGEMENT | Facility: CLINIC | Age: 53
End: 2023-05-22

## 2023-06-01 ENCOUNTER — TRANSFERRED RECORDS (OUTPATIENT)
Dept: HEALTH INFORMATION MANAGEMENT | Facility: CLINIC | Age: 53
End: 2023-06-01

## 2023-06-05 ENCOUNTER — TRANSFERRED RECORDS (OUTPATIENT)
Dept: HEALTH INFORMATION MANAGEMENT | Facility: CLINIC | Age: 53
End: 2023-06-05

## 2023-06-08 ENCOUNTER — TRANSFERRED RECORDS (OUTPATIENT)
Dept: HEALTH INFORMATION MANAGEMENT | Facility: CLINIC | Age: 53
End: 2023-06-08

## 2023-06-21 ENCOUNTER — TRANSFERRED RECORDS (OUTPATIENT)
Dept: HEALTH INFORMATION MANAGEMENT | Facility: CLINIC | Age: 53
End: 2023-06-21

## 2023-07-06 ENCOUNTER — TRANSFERRED RECORDS (OUTPATIENT)
Dept: HEALTH INFORMATION MANAGEMENT | Facility: CLINIC | Age: 53
End: 2023-07-06
Payer: COMMERCIAL

## 2023-07-10 PROBLEM — K29.80 DUODENITIS: Status: ACTIVE | Noted: 2021-04-21

## 2023-07-10 PROBLEM — R10.13 EPIGASTRIC PAIN: Status: ACTIVE | Noted: 2021-04-19

## 2023-07-10 PROBLEM — R10.9 RIGHT SIDED ABDOMINAL PAIN: Status: ACTIVE | Noted: 2023-07-10

## 2023-07-10 PROBLEM — R10.9 NONSPECIFIC ABDOMINAL PAIN: Status: ACTIVE | Noted: 2021-04-08

## 2023-07-11 ENCOUNTER — OFFICE VISIT (OUTPATIENT)
Dept: FAMILY MEDICINE | Facility: CLINIC | Age: 53
End: 2023-07-11
Payer: COMMERCIAL

## 2023-07-11 VITALS
BODY MASS INDEX: 25.48 KG/M2 | DIASTOLIC BLOOD PRESSURE: 76 MMHG | OXYGEN SATURATION: 100 % | SYSTOLIC BLOOD PRESSURE: 120 MMHG | HEART RATE: 76 BPM | TEMPERATURE: 98.4 F | HEIGHT: 71 IN | WEIGHT: 182 LBS | RESPIRATION RATE: 12 BRPM

## 2023-07-11 DIAGNOSIS — D64.9 ANEMIA, UNSPECIFIED TYPE: ICD-10-CM

## 2023-07-11 DIAGNOSIS — M51.27 HERNIATION OF INTERVERTEBRAL DISC BETWEEN L5 AND S1: ICD-10-CM

## 2023-07-11 DIAGNOSIS — K50.919 CROHN'S DISEASE WITH COMPLICATION, UNSPECIFIED GASTROINTESTINAL TRACT LOCATION (H): ICD-10-CM

## 2023-07-11 DIAGNOSIS — I45.6 ANOMALOUS ATRIOVENTRICULAR EXCITATION: ICD-10-CM

## 2023-07-11 DIAGNOSIS — Z01.818 PREOP GENERAL PHYSICAL EXAM: Primary | ICD-10-CM

## 2023-07-11 LAB
ANION GAP SERPL CALCULATED.3IONS-SCNC: 10 MMOL/L (ref 7–15)
BUN SERPL-MCNC: 26.9 MG/DL (ref 6–20)
CALCIUM SERPL-MCNC: 9.5 MG/DL (ref 8.6–10)
CHLORIDE SERPL-SCNC: 99 MMOL/L (ref 98–107)
CREAT SERPL-MCNC: 0.96 MG/DL (ref 0.67–1.17)
DEPRECATED HCO3 PLAS-SCNC: 28 MMOL/L (ref 22–29)
ERYTHROCYTE [DISTWIDTH] IN BLOOD BY AUTOMATED COUNT: 12.4 % (ref 10–15)
GFR SERPL CREATININE-BSD FRML MDRD: >90 ML/MIN/1.73M2
GLUCOSE SERPL-MCNC: 92 MG/DL (ref 70–99)
HCT VFR BLD AUTO: 36.6 % (ref 40–53)
HGB BLD-MCNC: 12.4 G/DL (ref 13.3–17.7)
MCH RBC QN AUTO: 31.6 PG (ref 26.5–33)
MCHC RBC AUTO-ENTMCNC: 33.9 G/DL (ref 31.5–36.5)
MCV RBC AUTO: 93 FL (ref 78–100)
PLATELET # BLD AUTO: 225 10E3/UL (ref 150–450)
POTASSIUM SERPL-SCNC: 4.2 MMOL/L (ref 3.4–5.3)
RBC # BLD AUTO: 3.93 10E6/UL (ref 4.4–5.9)
SODIUM SERPL-SCNC: 137 MMOL/L (ref 136–145)
WBC # BLD AUTO: 6.5 10E3/UL (ref 4–11)

## 2023-07-11 PROCEDURE — 99214 OFFICE O/P EST MOD 30 MIN: CPT | Performed by: PHYSICIAN ASSISTANT

## 2023-07-11 PROCEDURE — 85027 COMPLETE CBC AUTOMATED: CPT | Performed by: PHYSICIAN ASSISTANT

## 2023-07-11 PROCEDURE — 83540 ASSAY OF IRON: CPT | Performed by: PHYSICIAN ASSISTANT

## 2023-07-11 PROCEDURE — 82607 VITAMIN B-12: CPT | Performed by: PHYSICIAN ASSISTANT

## 2023-07-11 PROCEDURE — 80048 BASIC METABOLIC PNL TOTAL CA: CPT | Performed by: PHYSICIAN ASSISTANT

## 2023-07-11 PROCEDURE — 93005 ELECTROCARDIOGRAM TRACING: CPT | Performed by: PHYSICIAN ASSISTANT

## 2023-07-11 PROCEDURE — 82728 ASSAY OF FERRITIN: CPT | Performed by: PHYSICIAN ASSISTANT

## 2023-07-11 PROCEDURE — 83550 IRON BINDING TEST: CPT | Performed by: PHYSICIAN ASSISTANT

## 2023-07-11 PROCEDURE — 36415 COLL VENOUS BLD VENIPUNCTURE: CPT | Performed by: PHYSICIAN ASSISTANT

## 2023-07-11 RX ORDER — CYCLOBENZAPRINE HCL 5 MG
5 TABLET ORAL 3 TIMES DAILY PRN
COMMUNITY
Start: 2023-07-11

## 2023-07-11 RX ORDER — OXYCODONE HYDROCHLORIDE 5 MG/1
5 TABLET ORAL EVERY 6 HOURS PRN
Qty: 6 TABLET | Refills: 0 | COMMUNITY
Start: 2023-07-11 | End: 2023-07-18

## 2023-07-11 ASSESSMENT — ENCOUNTER SYMPTOMS
JOINT SWELLING: 0
HEARTBURN: 0
ARTHRALGIAS: 0
SORE THROAT: 0
PARESTHESIAS: 0
HEADACHES: 0
HEMATURIA: 0
SHORTNESS OF BREATH: 0
BACK PAIN: 1
EYE PAIN: 0
COUGH: 0
DYSURIA: 0
PALPITATIONS: 0
NERVOUS/ANXIOUS: 0
WEAKNESS: 0
MYALGIAS: 0
DIZZINESS: 0
DIARRHEA: 0
HEMATOCHEZIA: 0
NAUSEA: 0
FEVER: 0
CONSTIPATION: 0
CHILLS: 0
ABDOMINAL PAIN: 0
FREQUENCY: 0

## 2023-07-11 ASSESSMENT — PATIENT HEALTH QUESTIONNAIRE - PHQ9
SUM OF ALL RESPONSES TO PHQ QUESTIONS 1-9: 4
SUM OF ALL RESPONSES TO PHQ QUESTIONS 1-9: 4
10. IF YOU CHECKED OFF ANY PROBLEMS, HOW DIFFICULT HAVE THESE PROBLEMS MADE IT FOR YOU TO DO YOUR WORK, TAKE CARE OF THINGS AT HOME, OR GET ALONG WITH OTHER PEOPLE: NOT DIFFICULT AT ALL

## 2023-07-11 NOTE — H&P (VIEW-ONLY)
[facility]  [address]  [place]  [facility] [address]  Phone: [phone]  Fax: [phone]  Primary Provider: Floyd Johnson  Pre-op Performing Provider: SAKSHI HUGO    PREOPERATIVE EVALUATION:  Today's date: 7/11/2023    Emiliano Kelsey is a 52 year old male who presents for a preoperative evaluation.      7/11/2023     1:51 PM   Additional Questions   Roomed by Kalee Amaya   Accompanied by none     Surgical Information:  Surgery/Procedure: Repair of L5, S! buldging disc  Surgery Location: [facility]  Surgeon: Dr. Alejandro  Surgery Date: 7/21/23  Time of Surgery: TBD  Where patient plans to recover: At home with family  Fax number for surgical facility: [phone]    Assessment & Plan     The proposed surgical procedure is considered LOW risk.    Preop general physical exam  Herniation of intervertebral disc between L5 and S1  Emiliano is a 52-year-old male who presents to the clinic today for preop physical.  He will be undergoing a L5-S1 herniated disc repair on July 21, 2022 with Dr. Alejandro.  He is feeling well and at his baseline.  He denies any chest pain, shortness of breath, lightheaded or dizziness.  We will update CBC, BMP and an EKG.  He is currently taking oxycodone and Flexeril prescribed by orthopedics.  - CBC with platelets; Future  - Basic metabolic panel; Future  - EKG 12-lead, tracing only    Opal-Parkinson-White Syndrome  Asymptomatic at this time.  Diagnosed in college.  He has not had any symptoms recently.  He has seen cardiology in the past.  He is not currently on any beta-blockers.  We will check an EKG today.  - EKG 12-lead, tracing only    Crohn's disease with complication, unspecified gastrointestinal tract location (H)  Currently in remission.  He is not on any immunotherapy at this time.  Symptoms are well controlled.    Anemia  Chronic anemia. Will check CBC, Iron, Ferritin, and B12.      - No  identified additional risk factors other than previously addressed    Antiplatelet or Anticoagulation Medication Instructions:   - Patient is on no antiplatelet or anticoagulation medications.    Additional Medication Instructions:  Hold all NSAIDs, herbal supplements, and multivitamins 7 days prior to the procedure    RECOMMENDATION:  APPROVAL GIVEN to proceed with proposed procedure, without further diagnostic evaluation.    6}    Subjective       HPI related to upcoming procedure: Emiliano is a pleasant 52-year-old male who presents to the clinic today for preop physical.  He will be undergoing a L5-S1 herniated disc repair on July 21, 2023 with Dr. Alejandro.  He developed lower back pain in May.  He did trial a steroid injection and physical therapy with minimal to no improvement.  He then underwent an MRI which showed a herniated disc L5-S1.  Past medical history significant for Opal-Parkinson-White syndrome and Crohn's disease.  He is feeling well and at his baseline.  He denies any chest pain, shortness of breath, lightheaded or dizziness.  No abdominal pains.  No nausea vomiting or constipation.        7/11/2023     1:41 PM   Preop Questions   1. Have you ever had a heart attack or stroke? No   2. Have you ever had surgery on your heart or blood vessels, such as a stent placement, a coronary artery bypass, or surgery on an artery in your head, neck, heart, or legs? No   3. Do you have chest pain with activity? No   4. Do you have a history of  heart failure? No   5. Do you currently have a cold, bronchitis or symptoms of other infection? No   6. Do you have a cough, shortness of breath, or wheezing? No   7. Do you or anyone in your family have previous history of blood clots? No   8. Do you or does anyone in your family have a serious bleeding problem such as prolonged bleeding following surgeries or cuts? No   9. Have you ever had problems with anemia or been told to take iron pills? No   10. Have you had any  abnormal blood loss such as black, tarry or bloody stools? No   11. Have you ever had a blood transfusion? No   12. Are you willing to have a blood transfusion if it is medically needed before, during, or after your surgery? Yes   13. Have you or any of your relatives ever had problems with anesthesia? No   14. Do you have sleep apnea, excessive snoring or daytime drowsiness? No   15. Do you have any artifical heart valves or other implanted medical devices like a pacemaker, defibrillator, or continuous glucose monitor? No   16. Do you have artificial joints? No   17. Are you allergic to latex? No       Health Care Directive:  Patient does not have a Health Care Directive or Living Will: Discussed advance care planning with patient; however, patient declined at this time.    Preoperative Review of :   reviewed - no record of controlled substances prescribed.          Review of Systems   Constitutional: Negative for chills and fever.   HENT: Negative for congestion, ear pain, hearing loss and sore throat.    Eyes: Negative for pain and visual disturbance.   Respiratory: Negative for cough and shortness of breath.    Cardiovascular: Negative for chest pain, palpitations and peripheral edema.   Gastrointestinal: Negative for abdominal pain, constipation, diarrhea, heartburn, hematochezia and nausea.   Genitourinary: Negative for dysuria, frequency, genital sores, hematuria, impotence, penile discharge and urgency.   Musculoskeletal: Positive for back pain. Negative for arthralgias, joint swelling and myalgias.   Skin: Negative for rash.   Neurological: Negative for dizziness, weakness, headaches and paresthesias.   Psychiatric/Behavioral: Negative for mood changes. The patient is not nervous/anxious.        Patient Active Problem List    Diagnosis Date Noted     Right sided abdominal pain 07/10/2023     Priority: Medium     Duodenitis 04/21/2021     Priority: Medium     Epigastric pain 04/19/2021     Priority:  "Medium     Nonspecific abdominal pain 04/08/2021     Priority: Medium     Crohn's Disease      Priority: Medium     Created by Conversion  Replacement Utility updated for latest IMO load         Opal-Parkinson-White Syndrome      Priority: Medium     Created by Conversion         Allergies      Priority: Medium     Created by Conversion         Crohn's disease (H) 03/10/2009     Priority: Medium     Diverticular disease of colon 03/06/2009     Priority: Medium      Past Medical History:   Diagnosis Date     Anomalous atrioventricular excitation     Created by Conversion      Regional enteritis of unspecified site     Created by Conversion      Past Surgical History:   Procedure Laterality Date     [facility] REMOVAL OF TONSILS,<11 Y/O      Description: Tonsillectomy;  Recorded: 12/09/2008;     [facility] APPENDECTOMY      Description: Appendectomy;  Recorded: 12/09/2008;     [facility] REPAIR CRUCIATE LIGAMENT,KNEE      Description: Primary Repair Of Knee Ligament Cruciate Anterior;  Recorded: 12/09/2008;     Current Outpatient Medications   Medication Sig Dispense Refill     cyclobenzaprine (FLEXERIL) 5 MG tablet Take 1 tablet (5 mg) by mouth 3 times daily as needed for muscle spasms       ibuprofen (ADVIL,MOTRIN) 200 MG tablet [IBUPROFEN (ADVIL,MOTRIN) 200 MG TABLET] Take 200 mg by mouth every 6 (six) hours as needed for pain.       oxyCODONE (ROXICODONE) 5 MG tablet Take 1 tablet (5 mg) by mouth every 6 hours as needed for severe pain 6 tablet 0       Allergies   Allergen Reactions     Codeine Unknown     Dyspepsia          Social History     Tobacco Use     Smoking status: Never     Passive exposure: Never     Smokeless tobacco: Never   Substance Use Topics     Alcohol use: Not on file     No family history on file.  History   Drug Use Not on file         Objective     /76 (BP Location: Left arm, Patient Position: Sitting, Cuff Size: Adult Regular)   Pulse 76   Temp 98.4  F (36.9  C) (Oral)   Resp 12   Ht 1.803 m (5' 11\")  "  Wt 82.6 kg (182 lb)   SpO2 100%   BMI 25.38 kg/m      Physical Exam  Vitals and nursing note reviewed.   Constitutional:       General: He is not in acute distress.     Appearance: Normal appearance. He is well-developed and well-groomed. He is not ill-appearing or toxic-appearing.   HENT:      Head: Normocephalic and atraumatic.      Right Ear: Tympanic membrane, ear canal and external ear normal.      Left Ear: Tympanic membrane, ear canal and external ear normal.      Mouth/Throat:      Lips: Pink.      Mouth: Mucous membranes are moist.      Palate: No mass.      Pharynx: Oropharynx is clear. Uvula midline.      Tonsils: No tonsillar exudate or tonsillar abscesses.   Eyes:      General: Lids are normal.         Right eye: No discharge.         Left eye: No discharge.   Neck:      Trachea: Trachea normal.   Cardiovascular:      Rate and Rhythm: Normal rate and regular rhythm.      Heart sounds: S1 normal and S2 normal. No murmur heard.  Pulmonary:      Effort: Pulmonary effort is normal.      Breath sounds: Normal breath sounds and air entry.   Abdominal:      General: Bowel sounds are normal. There is no distension.      Palpations: Abdomen is soft.      Tenderness: There is no abdominal tenderness. There is no guarding or rebound.   Musculoskeletal:      Cervical back: Full passive range of motion without pain and neck supple.      Right lower leg: No edema.      Left lower leg: No edema.   Lymphadenopathy:      Cervical: No cervical adenopathy.   Skin:     General: Skin is warm and dry.      Findings: No lesion or rash.   Neurological:      General: No focal deficit present.      Mental Status: He is alert.      Cranial Nerves: No cranial nerve deficit.      Gait: Gait is intact.      Deep Tendon Reflexes:      Reflex Scores:       Patellar reflexes are 2+ on the right side and 2+ on the left side.  Psychiatric:         Attention and Perception: Attention normal.         Mood and Affect: Mood normal.          Speech: Speech normal.         Diagnostics:  Recent Results (from the past 240 hour(s))   EKG 12-lead, tracing only    Collection Time: 07/11/23  2:13 PM   Result Value Ref Range    Systolic Blood Pressure  mmHg    Diastolic Blood Pressure  mmHg    Ventricular Rate 65 BPM    Atrial Rate 65 BPM    ID Interval 140 ms    QRS Duration 86 ms     ms    QTc 418 ms    P Axis 48 degrees    R AXIS 69 degrees    T Axis 49 degrees    Interpretation ECG       Sinus rhythm  Normal ECG  When compared with ECG of 09-MAY-1997 22:59,  Vent. rate has decreased BY  44 BPM     CBC with platelets    Collection Time: 07/11/23  2:22 PM   Result Value Ref Range    WBC Count 6.5 4.0 - 11.0 10e3/uL    RBC Count 3.93 (L) 4.40 - 5.90 10e6/uL    Hemoglobin 12.4 (L) 13.3 - 17.7 g/dL    Hematocrit 36.6 (L) 40.0 - 53.0 %    MCV 93 78 - 100 fL    MCH 31.6 26.5 - 33.0 pg    MCHC 33.9 31.5 - 36.5 g/dL    RDW 12.4 10.0 - 15.0 %    Platelet Count 225 150 - 450 10e3/uL   Basic metabolic panel    Collection Time: 07/11/23  2:22 PM   Result Value Ref Range    Sodium 137 136 - 145 mmol/L    Potassium 4.2 3.4 - 5.3 mmol/L    Chloride 99 98 - 107 mmol/L    Carbon Dioxide (CO2) 28 22 - 29 mmol/L    Anion Gap 10 7 - 15 mmol/L    Urea Nitrogen 26.9 (H) 6.0 - 20.0 mg/dL    Creatinine 0.96 0.67 - 1.17 mg/dL    Calcium 9.5 8.6 - 10.0 mg/dL    Glucose 92 70 - 99 mg/dL    GFR Estimate >90 >60 mL/min/1.73m2          Revised Cardiac Risk Index (RCRI):  The patient has the following serious cardiovascular risks for perioperative complications:   - No serious cardiac risks = 0 points     RCRI Interpretation: 0 points: Class I (very low risk - 0.4% complication rate)    Vasquez Score > 4 Lucy    Signed Electronically by: Yohannes Bermudez PA-C  Copy of this evaluation report is provided to requesting physician.      Answers for HPI/ROS submitted by the patient on 7/11/2023  If you checked off any problems, how difficult have these problems made it for you to do your  work, take care of things at home, or get along with other people?: Not difficult at all  PHQ9 TOTAL SCORE: 4

## 2023-07-11 NOTE — PROGRESS NOTES
[facility]  [address]  [place]  [facility] [address]  Phone: [phone]  Fax: [phone]  Primary Provider: Floyd Johnosn  Pre-op Performing Provider: SAKSHI HUGO    PREOPERATIVE EVALUATION:  Today's date: 7/11/2023    Emiliano Kelsey is a 52 year old male who presents for a preoperative evaluation.      7/11/2023     1:51 PM   Additional Questions   Roomed by Kalee Amaya   Accompanied by none     Surgical Information:  Surgery/Procedure: Repair of L5, S! buldging disc  Surgery Location: [facility]  Surgeon: Dr. Alejandro  Surgery Date: 7/21/23  Time of Surgery: TBD  Where patient plans to recover: At home with family  Fax number for surgical facility: [phone]    Assessment & Plan     The proposed surgical procedure is considered LOW risk.    Preop general physical exam  Herniation of intervertebral disc between L5 and S1  Emiliano is a 52-year-old male who presents to the clinic today for preop physical.  He will be undergoing a L5-S1 herniated disc repair on July 21, 2022 with Dr. Alejandro.  He is feeling well and at his baseline.  He denies any chest pain, shortness of breath, lightheaded or dizziness.  We will update CBC, BMP and an EKG.  He is currently taking oxycodone and Flexeril prescribed by orthopedics.  - CBC with platelets; Future  - Basic metabolic panel; Future  - EKG 12-lead, tracing only    Opal-Parkinson-White Syndrome  Asymptomatic at this time.  Diagnosed in college.  He has not had any symptoms recently.  He has seen cardiology in the past.  He is not currently on any beta-blockers.  We will check an EKG today.  - EKG 12-lead, tracing only    Crohn's disease with complication, unspecified gastrointestinal tract location (H)  Currently in remission.  He is not on any immunotherapy at this time.  Symptoms are well controlled.    Anemia  Chronic anemia. Will check CBC, Iron, Ferritin, and B12.      - No  identified additional risk factors other than previously addressed    Antiplatelet or Anticoagulation Medication Instructions:   - Patient is on no antiplatelet or anticoagulation medications.    Additional Medication Instructions:  Hold all NSAIDs, herbal supplements, and multivitamins 7 days prior to the procedure    RECOMMENDATION:  APPROVAL GIVEN to proceed with proposed procedure, without further diagnostic evaluation.    6}    Subjective       HPI related to upcoming procedure: Emiliano is a pleasant 52-year-old male who presents to the clinic today for preop physical.  He will be undergoing a L5-S1 herniated disc repair on July 21, 2023 with Dr. Alejandro.  He developed lower back pain in May.  He did trial a steroid injection and physical therapy with minimal to no improvement.  He then underwent an MRI which showed a herniated disc L5-S1.  Past medical history significant for Opal-Parkinson-White syndrome and Crohn's disease.  He is feeling well and at his baseline.  He denies any chest pain, shortness of breath, lightheaded or dizziness.  No abdominal pains.  No nausea vomiting or constipation.        7/11/2023     1:41 PM   Preop Questions   1. Have you ever had a heart attack or stroke? No   2. Have you ever had surgery on your heart or blood vessels, such as a stent placement, a coronary artery bypass, or surgery on an artery in your head, neck, heart, or legs? No   3. Do you have chest pain with activity? No   4. Do you have a history of  heart failure? No   5. Do you currently have a cold, bronchitis or symptoms of other infection? No   6. Do you have a cough, shortness of breath, or wheezing? No   7. Do you or anyone in your family have previous history of blood clots? No   8. Do you or does anyone in your family have a serious bleeding problem such as prolonged bleeding following surgeries or cuts? No   9. Have you ever had problems with anemia or been told to take iron pills? No   10. Have you had any  abnormal blood loss such as black, tarry or bloody stools? No   11. Have you ever had a blood transfusion? No   12. Are you willing to have a blood transfusion if it is medically needed before, during, or after your surgery? Yes   13. Have you or any of your relatives ever had problems with anesthesia? No   14. Do you have sleep apnea, excessive snoring or daytime drowsiness? No   15. Do you have any artifical heart valves or other implanted medical devices like a pacemaker, defibrillator, or continuous glucose monitor? No   16. Do you have artificial joints? No   17. Are you allergic to latex? No       Health Care Directive:  Patient does not have a Health Care Directive or Living Will: Discussed advance care planning with patient; however, patient declined at this time.    Preoperative Review of :   reviewed - no record of controlled substances prescribed.          Review of Systems   Constitutional: Negative for chills and fever.   HENT: Negative for congestion, ear pain, hearing loss and sore throat.    Eyes: Negative for pain and visual disturbance.   Respiratory: Negative for cough and shortness of breath.    Cardiovascular: Negative for chest pain, palpitations and peripheral edema.   Gastrointestinal: Negative for abdominal pain, constipation, diarrhea, heartburn, hematochezia and nausea.   Genitourinary: Negative for dysuria, frequency, genital sores, hematuria, impotence, penile discharge and urgency.   Musculoskeletal: Positive for back pain. Negative for arthralgias, joint swelling and myalgias.   Skin: Negative for rash.   Neurological: Negative for dizziness, weakness, headaches and paresthesias.   Psychiatric/Behavioral: Negative for mood changes. The patient is not nervous/anxious.        Patient Active Problem List    Diagnosis Date Noted     Right sided abdominal pain 07/10/2023     Priority: Medium     Duodenitis 04/21/2021     Priority: Medium     Epigastric pain 04/19/2021     Priority:  "Medium     Nonspecific abdominal pain 04/08/2021     Priority: Medium     Crohn's Disease      Priority: Medium     Created by Conversion  Replacement Utility updated for latest IMO load         Opal-Parkinson-White Syndrome      Priority: Medium     Created by Conversion         Allergies      Priority: Medium     Created by Conversion         Crohn's disease (H) 03/10/2009     Priority: Medium     Diverticular disease of colon 03/06/2009     Priority: Medium      Past Medical History:   Diagnosis Date     Anomalous atrioventricular excitation     Created by Conversion      Regional enteritis of unspecified site     Created by Conversion      Past Surgical History:   Procedure Laterality Date     [facility] REMOVAL OF TONSILS,<11 Y/O      Description: Tonsillectomy;  Recorded: 12/09/2008;     [facility] APPENDECTOMY      Description: Appendectomy;  Recorded: 12/09/2008;     [facility] REPAIR CRUCIATE LIGAMENT,KNEE      Description: Primary Repair Of Knee Ligament Cruciate Anterior;  Recorded: 12/09/2008;     Current Outpatient Medications   Medication Sig Dispense Refill     cyclobenzaprine (FLEXERIL) 5 MG tablet Take 1 tablet (5 mg) by mouth 3 times daily as needed for muscle spasms       ibuprofen (ADVIL,MOTRIN) 200 MG tablet [IBUPROFEN (ADVIL,MOTRIN) 200 MG TABLET] Take 200 mg by mouth every 6 (six) hours as needed for pain.       oxyCODONE (ROXICODONE) 5 MG tablet Take 1 tablet (5 mg) by mouth every 6 hours as needed for severe pain 6 tablet 0       Allergies   Allergen Reactions     Codeine Unknown     Dyspepsia          Social History     Tobacco Use     Smoking status: Never     Passive exposure: Never     Smokeless tobacco: Never   Substance Use Topics     Alcohol use: Not on file     No family history on file.  History   Drug Use Not on file         Objective     /76 (BP Location: Left arm, Patient Position: Sitting, Cuff Size: Adult Regular)   Pulse 76   Temp 98.4  F (36.9  C) (Oral)   Resp 12   Ht 1.803 m (5' 11\")  "  Wt 82.6 kg (182 lb)   SpO2 100%   BMI 25.38 kg/m      Physical Exam  Vitals and nursing note reviewed.   Constitutional:       General: He is not in acute distress.     Appearance: Normal appearance. He is well-developed and well-groomed. He is not ill-appearing or toxic-appearing.   HENT:      Head: Normocephalic and atraumatic.      Right Ear: Tympanic membrane, ear canal and external ear normal.      Left Ear: Tympanic membrane, ear canal and external ear normal.      Mouth/Throat:      Lips: Pink.      Mouth: Mucous membranes are moist.      Palate: No mass.      Pharynx: Oropharynx is clear. Uvula midline.      Tonsils: No tonsillar exudate or tonsillar abscesses.   Eyes:      General: Lids are normal.         Right eye: No discharge.         Left eye: No discharge.   Neck:      Trachea: Trachea normal.   Cardiovascular:      Rate and Rhythm: Normal rate and regular rhythm.      Heart sounds: S1 normal and S2 normal. No murmur heard.  Pulmonary:      Effort: Pulmonary effort is normal.      Breath sounds: Normal breath sounds and air entry.   Abdominal:      General: Bowel sounds are normal. There is no distension.      Palpations: Abdomen is soft.      Tenderness: There is no abdominal tenderness. There is no guarding or rebound.   Musculoskeletal:      Cervical back: Full passive range of motion without pain and neck supple.      Right lower leg: No edema.      Left lower leg: No edema.   Lymphadenopathy:      Cervical: No cervical adenopathy.   Skin:     General: Skin is warm and dry.      Findings: No lesion or rash.   Neurological:      General: No focal deficit present.      Mental Status: He is alert.      Cranial Nerves: No cranial nerve deficit.      Gait: Gait is intact.      Deep Tendon Reflexes:      Reflex Scores:       Patellar reflexes are 2+ on the right side and 2+ on the left side.  Psychiatric:         Attention and Perception: Attention normal.         Mood and Affect: Mood normal.          Speech: Speech normal.         Diagnostics:  Recent Results (from the past 240 hour(s))   EKG 12-lead, tracing only    Collection Time: 07/11/23  2:13 PM   Result Value Ref Range    Systolic Blood Pressure  mmHg    Diastolic Blood Pressure  mmHg    Ventricular Rate 65 BPM    Atrial Rate 65 BPM    ID Interval 140 ms    QRS Duration 86 ms     ms    QTc 418 ms    P Axis 48 degrees    R AXIS 69 degrees    T Axis 49 degrees    Interpretation ECG       Sinus rhythm  Normal ECG  When compared with ECG of 09-MAY-1997 22:59,  Vent. rate has decreased BY  44 BPM     CBC with platelets    Collection Time: 07/11/23  2:22 PM   Result Value Ref Range    WBC Count 6.5 4.0 - 11.0 10e3/uL    RBC Count 3.93 (L) 4.40 - 5.90 10e6/uL    Hemoglobin 12.4 (L) 13.3 - 17.7 g/dL    Hematocrit 36.6 (L) 40.0 - 53.0 %    MCV 93 78 - 100 fL    MCH 31.6 26.5 - 33.0 pg    MCHC 33.9 31.5 - 36.5 g/dL    RDW 12.4 10.0 - 15.0 %    Platelet Count 225 150 - 450 10e3/uL   Basic metabolic panel    Collection Time: 07/11/23  2:22 PM   Result Value Ref Range    Sodium 137 136 - 145 mmol/L    Potassium 4.2 3.4 - 5.3 mmol/L    Chloride 99 98 - 107 mmol/L    Carbon Dioxide (CO2) 28 22 - 29 mmol/L    Anion Gap 10 7 - 15 mmol/L    Urea Nitrogen 26.9 (H) 6.0 - 20.0 mg/dL    Creatinine 0.96 0.67 - 1.17 mg/dL    Calcium 9.5 8.6 - 10.0 mg/dL    Glucose 92 70 - 99 mg/dL    GFR Estimate >90 >60 mL/min/1.73m2          Revised Cardiac Risk Index (RCRI):  The patient has the following serious cardiovascular risks for perioperative complications:   - No serious cardiac risks = 0 points     RCRI Interpretation: 0 points: Class I (very low risk - 0.4% complication rate)    Vasquez Score > 4 Lucy    Signed Electronically by: Yohannes Bermudez PA-C  Copy of this evaluation report is provided to requesting physician.      Answers for HPI/ROS submitted by the patient on 7/11/2023  If you checked off any problems, how difficult have these problems made it for you to do your  work, take care of things at home, or get along with other people?: Not difficult at all  PHQ9 TOTAL SCORE: 4

## 2023-07-12 ENCOUNTER — TELEPHONE (OUTPATIENT)
Dept: FAMILY MEDICINE | Facility: CLINIC | Age: 53
End: 2023-07-12
Payer: COMMERCIAL

## 2023-07-12 ENCOUNTER — NURSE TRIAGE (OUTPATIENT)
Dept: NURSING | Facility: CLINIC | Age: 53
End: 2023-07-12
Payer: COMMERCIAL

## 2023-07-12 DIAGNOSIS — D64.9 ANEMIA, UNSPECIFIED TYPE: Primary | ICD-10-CM

## 2023-07-12 LAB
FERRITIN SERPL-MCNC: 423 NG/ML (ref 31–409)
IRON BINDING CAPACITY (ROCHE): 267 UG/DL (ref 240–430)
IRON SATN MFR SERPL: 43 % (ref 15–46)
IRON SERPL-MCNC: 114 UG/DL (ref 61–157)
VIT B12 SERPL-MCNC: 426 PG/ML (ref 232–1245)

## 2023-07-12 NOTE — TELEPHONE ENCOUNTER
Patient called back regarding Lab results and message relayed to patient per PCP request that his Hbg was low with  iron and B12 normal and would like to refer for a colonoscopy, patient agrees with plan and would like a referral placed.

## 2023-07-12 NOTE — TELEPHONE ENCOUNTER
----- Message from Yohannes Bermudez PA-C sent at 7/12/2023  1:31 PM CDT -----  Please call Emiliano and let him know that his Hgb is low and it looks like this has been for some time. Iron and B12 were normal. I would recommend getting set up for a colonoscopy. Dose not look like he has has one. I will placed the referral if he would like. Please let me know.

## 2023-07-12 NOTE — TELEPHONE ENCOUNTER
Left message to call back for: pt  Information to relay to patient: please adv lab results per Javier

## 2023-07-12 NOTE — PROGRESS NOTES
Pre op faxed to [facility]   Answers for HPI/ROS submitted by the patient on 7/11/2023  If you checked off any problems, how difficult have these problems made it for you to do your work, take care of things at home, or get along with other people?: Not difficult at all  PHQ9 TOTAL SCORE: 4

## 2023-07-12 NOTE — TELEPHONE ENCOUNTER
Detailed message left for pt that referral has been placed, [facility] should contact him and if they dont or he has further questions he can contact us.

## 2023-07-12 NOTE — TELEPHONE ENCOUNTER
Patient called back regarding Lab results and message relayed to patient per PCP request that his Hbg was low with  iron and B12 normal and would like to refer for a colonoscopy, patient agrees with plan and would like a referral placed. Encounter regarding this call found and amended with new information

## 2023-07-18 RX ORDER — TRAMADOL HYDROCHLORIDE 50 MG/1
50 TABLET ORAL 3 TIMES DAILY PRN
Status: ON HOLD | COMMUNITY
End: 2023-07-21

## 2023-07-21 ENCOUNTER — ANESTHESIA EVENT (OUTPATIENT)
Dept: SURGERY | Facility: CLINIC | Age: 53
End: 2023-07-21
Payer: COMMERCIAL

## 2023-07-21 ENCOUNTER — ANESTHESIA (OUTPATIENT)
Dept: SURGERY | Facility: CLINIC | Age: 53
End: 2023-07-21
Payer: COMMERCIAL

## 2023-07-21 ENCOUNTER — HOSPITAL ENCOUNTER (OUTPATIENT)
Facility: CLINIC | Age: 53
Discharge: HOME | End: 2023-07-21
Attending: ORTHOPAEDIC SURGERY | Admitting: ORTHOPAEDIC SURGERY
Payer: COMMERCIAL

## 2023-07-21 ENCOUNTER — APPOINTMENT (OUTPATIENT)
Dept: RADIOLOGY | Facility: CLINIC | Age: 53
End: 2023-07-21
Attending: ORTHOPAEDIC SURGERY
Payer: COMMERCIAL

## 2023-07-21 VITALS
OXYGEN SATURATION: 98 % | TEMPERATURE: 98.7 F | WEIGHT: 176.4 LBS | DIASTOLIC BLOOD PRESSURE: 72 MMHG | SYSTOLIC BLOOD PRESSURE: 117 MMHG | HEIGHT: 71 IN | HEART RATE: 71 BPM | BODY MASS INDEX: 24.69 KG/M2 | RESPIRATION RATE: 14 BRPM

## 2023-07-21 DIAGNOSIS — Z98.890 S/P LUMBAR DISCECTOMY: Primary | ICD-10-CM

## 2023-07-21 PROCEDURE — 999N000141 HC STATISTIC PRE-PROCEDURE NURSING ASSESSMENT: Performed by: ORTHOPAEDIC SURGERY

## 2023-07-21 PROCEDURE — 250N000025 HC SEVOFLURANE, PER MIN: Performed by: ORTHOPAEDIC SURGERY

## 2023-07-21 PROCEDURE — 258N000003 HC RX IP 258 OP 636: Performed by: ANESTHESIOLOGY

## 2023-07-21 PROCEDURE — 258N000003 HC RX IP 258 OP 636: Performed by: NURSE ANESTHETIST, CERTIFIED REGISTERED

## 2023-07-21 PROCEDURE — 250N000011 HC RX IP 250 OP 636: Performed by: ORTHOPAEDIC SURGERY

## 2023-07-21 PROCEDURE — 272N000001 HC OR GENERAL SUPPLY STERILE: Performed by: ORTHOPAEDIC SURGERY

## 2023-07-21 PROCEDURE — 250N000013 HC RX MED GY IP 250 OP 250 PS 637: Performed by: ANESTHESIOLOGY

## 2023-07-21 PROCEDURE — 250N000011 HC RX IP 250 OP 636: Performed by: NURSE ANESTHETIST, CERTIFIED REGISTERED

## 2023-07-21 PROCEDURE — 360N000084 HC SURGERY LEVEL 4 W/ FLUORO, PER MIN: Performed by: ORTHOPAEDIC SURGERY

## 2023-07-21 PROCEDURE — 250N000009 HC RX 250: Performed by: ORTHOPAEDIC SURGERY

## 2023-07-21 PROCEDURE — 250N000011 HC RX IP 250 OP 636: Performed by: ANESTHESIOLOGY

## 2023-07-21 PROCEDURE — 999N000182 XR SURGERY CARM FLUORO GREATER THAN 5 MIN: Mod: TC

## 2023-07-21 PROCEDURE — 250N000013 HC RX MED GY IP 250 OP 250 PS 637: Performed by: ORTHOPAEDIC SURGERY

## 2023-07-21 PROCEDURE — 370N000017 HC ANESTHESIA TECHNICAL FEE, PER MIN: Performed by: ORTHOPAEDIC SURGERY

## 2023-07-21 PROCEDURE — 710N000012 HC RECOVERY PHASE 2, PER MINUTE: Performed by: ORTHOPAEDIC SURGERY

## 2023-07-21 PROCEDURE — 250N000005 HC OR RX SURGIFLO HEMOSTATIC MATRIX 10ML 199102S OPNP: Performed by: ORTHOPAEDIC SURGERY

## 2023-07-21 PROCEDURE — 250N000009 HC RX 250: Performed by: NURSE ANESTHETIST, CERTIFIED REGISTERED

## 2023-07-21 PROCEDURE — 710N000010 HC RECOVERY PHASE 1, LEVEL 2, PER MIN: Performed by: ORTHOPAEDIC SURGERY

## 2023-07-21 RX ORDER — OXYCODONE HYDROCHLORIDE 5 MG/1
5 TABLET ORAL EVERY 4 HOURS PRN
Status: DISCONTINUED | OUTPATIENT
Start: 2023-07-21 | End: 2023-07-21 | Stop reason: HOSPADM

## 2023-07-21 RX ORDER — SODIUM CHLORIDE, SODIUM LACTATE, POTASSIUM CHLORIDE, CALCIUM CHLORIDE 600; 310; 30; 20 MG/100ML; MG/100ML; MG/100ML; MG/100ML
INJECTION, SOLUTION INTRAVENOUS CONTINUOUS
Status: DISCONTINUED | OUTPATIENT
Start: 2023-07-21 | End: 2023-07-21 | Stop reason: HOSPADM

## 2023-07-21 RX ORDER — HYDROMORPHONE HCL IN WATER/PF 6 MG/30 ML
0.2 PATIENT CONTROLLED ANALGESIA SYRINGE INTRAVENOUS
Status: DISCONTINUED | OUTPATIENT
Start: 2023-07-21 | End: 2023-07-21 | Stop reason: HOSPADM

## 2023-07-21 RX ORDER — MAGNESIUM HYDROXIDE 1200 MG/15ML
LIQUID ORAL PRN
Status: DISCONTINUED | OUTPATIENT
Start: 2023-07-21 | End: 2023-07-21 | Stop reason: HOSPADM

## 2023-07-21 RX ORDER — ACETAMINOPHEN 325 MG/1
975 TABLET ORAL ONCE
Status: COMPLETED | OUTPATIENT
Start: 2023-07-21 | End: 2023-07-21

## 2023-07-21 RX ORDER — HYDROMORPHONE HCL IN WATER/PF 6 MG/30 ML
0.4 PATIENT CONTROLLED ANALGESIA SYRINGE INTRAVENOUS EVERY 5 MIN PRN
Status: DISCONTINUED | OUTPATIENT
Start: 2023-07-21 | End: 2023-07-21 | Stop reason: HOSPADM

## 2023-07-21 RX ORDER — GLYCOPYRROLATE 0.2 MG/ML
INJECTION, SOLUTION INTRAMUSCULAR; INTRAVENOUS PRN
Status: DISCONTINUED | OUTPATIENT
Start: 2023-07-21 | End: 2023-07-21

## 2023-07-21 RX ORDER — PROCHLORPERAZINE MALEATE 10 MG
10 TABLET ORAL EVERY 6 HOURS PRN
Status: DISCONTINUED | OUTPATIENT
Start: 2023-07-21 | End: 2023-07-21 | Stop reason: HOSPADM

## 2023-07-21 RX ORDER — ONDANSETRON 2 MG/ML
4 INJECTION INTRAMUSCULAR; INTRAVENOUS EVERY 6 HOURS PRN
Status: DISCONTINUED | OUTPATIENT
Start: 2023-07-21 | End: 2023-07-21 | Stop reason: HOSPADM

## 2023-07-21 RX ORDER — OXYCODONE HYDROCHLORIDE 5 MG/1
5 TABLET ORAL
Status: COMPLETED | OUTPATIENT
Start: 2023-07-21 | End: 2023-07-21

## 2023-07-21 RX ORDER — ACETAMINOPHEN 325 MG/1
975 TABLET ORAL EVERY 8 HOURS
Status: DISCONTINUED | OUTPATIENT
Start: 2023-07-21 | End: 2023-07-21 | Stop reason: HOSPADM

## 2023-07-21 RX ORDER — CEFAZOLIN SODIUM/WATER 2 G/20 ML
2 SYRINGE (ML) INTRAVENOUS SEE ADMIN INSTRUCTIONS
Status: DISCONTINUED | OUTPATIENT
Start: 2023-07-21 | End: 2023-07-21 | Stop reason: HOSPADM

## 2023-07-21 RX ORDER — FENTANYL CITRATE 50 UG/ML
25 INJECTION, SOLUTION INTRAMUSCULAR; INTRAVENOUS
Status: DISCONTINUED | OUTPATIENT
Start: 2023-07-21 | End: 2023-07-21 | Stop reason: HOSPADM

## 2023-07-21 RX ORDER — HYDROMORPHONE HCL IN WATER/PF 6 MG/30 ML
0.4 PATIENT CONTROLLED ANALGESIA SYRINGE INTRAVENOUS
Status: DISCONTINUED | OUTPATIENT
Start: 2023-07-21 | End: 2023-07-21 | Stop reason: HOSPADM

## 2023-07-21 RX ORDER — ACETAMINOPHEN 325 MG/1
650 TABLET ORAL EVERY 4 HOURS PRN
Status: DISCONTINUED | OUTPATIENT
Start: 2023-07-24 | End: 2023-07-21 | Stop reason: HOSPADM

## 2023-07-21 RX ORDER — FENTANYL CITRATE 50 UG/ML
50 INJECTION, SOLUTION INTRAMUSCULAR; INTRAVENOUS EVERY 5 MIN PRN
Status: DISCONTINUED | OUTPATIENT
Start: 2023-07-21 | End: 2023-07-21 | Stop reason: HOSPADM

## 2023-07-21 RX ORDER — OXYCODONE HYDROCHLORIDE 5 MG/1
10 TABLET ORAL EVERY 4 HOURS PRN
Status: DISCONTINUED | OUTPATIENT
Start: 2023-07-21 | End: 2023-07-21 | Stop reason: HOSPADM

## 2023-07-21 RX ORDER — ONDANSETRON 4 MG/1
4 TABLET, ORALLY DISINTEGRATING ORAL EVERY 6 HOURS PRN
Status: DISCONTINUED | OUTPATIENT
Start: 2023-07-21 | End: 2023-07-21 | Stop reason: HOSPADM

## 2023-07-21 RX ORDER — ONDANSETRON 2 MG/ML
INJECTION INTRAMUSCULAR; INTRAVENOUS PRN
Status: DISCONTINUED | OUTPATIENT
Start: 2023-07-21 | End: 2023-07-21

## 2023-07-21 RX ORDER — ONDANSETRON 4 MG/1
4 TABLET, ORALLY DISINTEGRATING ORAL EVERY 30 MIN PRN
Status: DISCONTINUED | OUTPATIENT
Start: 2023-07-21 | End: 2023-07-21 | Stop reason: HOSPADM

## 2023-07-21 RX ORDER — ACETAMINOPHEN 325 MG/1
975 TABLET ORAL ONCE
Status: DISCONTINUED | OUTPATIENT
Start: 2023-07-21 | End: 2023-07-21 | Stop reason: HOSPADM

## 2023-07-21 RX ORDER — FENTANYL CITRATE 50 UG/ML
100 INJECTION, SOLUTION INTRAMUSCULAR; INTRAVENOUS ONCE
Status: COMPLETED | OUTPATIENT
Start: 2023-07-21 | End: 2023-07-21

## 2023-07-21 RX ORDER — BUPIVACAINE HYDROCHLORIDE 2.5 MG/ML
INJECTION, SOLUTION INFILTRATION; PERINEURAL
Status: DISCONTINUED
Start: 2023-07-21 | End: 2023-07-21 | Stop reason: HOSPADM

## 2023-07-21 RX ORDER — CEPHALEXIN 500 MG/1
500 CAPSULE ORAL 3 TIMES DAILY
Qty: 6 CAPSULE | Refills: 0 | Status: SHIPPED | OUTPATIENT
Start: 2023-07-21

## 2023-07-21 RX ORDER — NALOXONE HYDROCHLORIDE 0.4 MG/ML
0.4 INJECTION, SOLUTION INTRAMUSCULAR; INTRAVENOUS; SUBCUTANEOUS
Status: DISCONTINUED | OUTPATIENT
Start: 2023-07-21 | End: 2023-07-21 | Stop reason: HOSPADM

## 2023-07-21 RX ORDER — HYDROCODONE BITARTRATE AND ACETAMINOPHEN 5; 325 MG/1; MG/1
1-2 TABLET ORAL EVERY 4 HOURS PRN
Qty: 30 TABLET | Refills: 0 | Status: SHIPPED | OUTPATIENT
Start: 2023-07-21

## 2023-07-21 RX ORDER — GABAPENTIN 300 MG/1
300 CAPSULE ORAL
Status: COMPLETED | OUTPATIENT
Start: 2023-07-21 | End: 2023-07-21

## 2023-07-21 RX ORDER — HYDROXYZINE HYDROCHLORIDE 25 MG/1
25 TABLET, FILM COATED ORAL EVERY 6 HOURS PRN
Status: DISCONTINUED | OUTPATIENT
Start: 2023-07-21 | End: 2023-07-21 | Stop reason: HOSPADM

## 2023-07-21 RX ORDER — HYDROMORPHONE HCL IN WATER/PF 6 MG/30 ML
0.2 PATIENT CONTROLLED ANALGESIA SYRINGE INTRAVENOUS EVERY 5 MIN PRN
Status: DISCONTINUED | OUTPATIENT
Start: 2023-07-21 | End: 2023-07-21 | Stop reason: HOSPADM

## 2023-07-21 RX ORDER — ONDANSETRON 2 MG/ML
4 INJECTION INTRAMUSCULAR; INTRAVENOUS EVERY 30 MIN PRN
Status: DISCONTINUED | OUTPATIENT
Start: 2023-07-21 | End: 2023-07-21 | Stop reason: HOSPADM

## 2023-07-21 RX ORDER — LIDOCAINE 40 MG/G
CREAM TOPICAL
Status: DISCONTINUED | OUTPATIENT
Start: 2023-07-21 | End: 2023-07-21 | Stop reason: HOSPADM

## 2023-07-21 RX ORDER — HYDROXYZINE HYDROCHLORIDE 25 MG/1
25-50 TABLET, FILM COATED ORAL EVERY 6 HOURS PRN
Qty: 30 TABLET | Refills: 0 | Status: SHIPPED | OUTPATIENT
Start: 2023-07-21

## 2023-07-21 RX ORDER — LIDOCAINE HYDROCHLORIDE 10 MG/ML
INJECTION, SOLUTION INFILTRATION; PERINEURAL PRN
Status: DISCONTINUED | OUTPATIENT
Start: 2023-07-21 | End: 2023-07-21

## 2023-07-21 RX ORDER — KETAMINE HYDROCHLORIDE 10 MG/ML
INJECTION INTRAMUSCULAR; INTRAVENOUS PRN
Status: DISCONTINUED | OUTPATIENT
Start: 2023-07-21 | End: 2023-07-21

## 2023-07-21 RX ORDER — FENTANYL CITRATE 50 UG/ML
25 INJECTION, SOLUTION INTRAMUSCULAR; INTRAVENOUS EVERY 5 MIN PRN
Status: DISCONTINUED | OUTPATIENT
Start: 2023-07-21 | End: 2023-07-21 | Stop reason: HOSPADM

## 2023-07-21 RX ORDER — NALOXONE HYDROCHLORIDE 0.4 MG/ML
0.2 INJECTION, SOLUTION INTRAMUSCULAR; INTRAVENOUS; SUBCUTANEOUS
Status: DISCONTINUED | OUTPATIENT
Start: 2023-07-21 | End: 2023-07-21 | Stop reason: HOSPADM

## 2023-07-21 RX ORDER — OXYCODONE HYDROCHLORIDE 5 MG/1
10 TABLET ORAL
Status: DISCONTINUED | OUTPATIENT
Start: 2023-07-21 | End: 2023-07-21 | Stop reason: HOSPADM

## 2023-07-21 RX ORDER — FENTANYL CITRATE 50 UG/ML
INJECTION, SOLUTION INTRAMUSCULAR; INTRAVENOUS PRN
Status: DISCONTINUED | OUTPATIENT
Start: 2023-07-21 | End: 2023-07-21

## 2023-07-21 RX ORDER — DEXAMETHASONE SODIUM PHOSPHATE 4 MG/ML
INJECTION, SOLUTION INTRA-ARTICULAR; INTRALESIONAL; INTRAMUSCULAR; INTRAVENOUS; SOFT TISSUE PRN
Status: DISCONTINUED | OUTPATIENT
Start: 2023-07-21 | End: 2023-07-21

## 2023-07-21 RX ORDER — PROPOFOL 10 MG/ML
INJECTION, EMULSION INTRAVENOUS PRN
Status: DISCONTINUED | OUTPATIENT
Start: 2023-07-21 | End: 2023-07-21

## 2023-07-21 RX ORDER — MELOXICAM 15 MG/1
15 TABLET ORAL DAILY
COMMUNITY

## 2023-07-21 RX ORDER — BUPIVACAINE HYDROCHLORIDE 2.5 MG/ML
INJECTION, SOLUTION INFILTRATION; PERINEURAL PRN
Status: DISCONTINUED | OUTPATIENT
Start: 2023-07-21 | End: 2023-07-21 | Stop reason: HOSPADM

## 2023-07-21 RX ORDER — CEFAZOLIN SODIUM/WATER 2 G/20 ML
2 SYRINGE (ML) INTRAVENOUS
Status: COMPLETED | OUTPATIENT
Start: 2023-07-21 | End: 2023-07-21

## 2023-07-21 RX ADMIN — DEXMEDETOMIDINE HYDROCHLORIDE 12 MCG: 100 INJECTION, SOLUTION INTRAVENOUS at 09:14

## 2023-07-21 RX ADMIN — PHENYLEPHRINE HYDROCHLORIDE 25 MCG: 10 INJECTION INTRAVENOUS at 10:21

## 2023-07-21 RX ADMIN — ACETAMINOPHEN 975 MG: 325 TABLET ORAL at 07:41

## 2023-07-21 RX ADMIN — PHENYLEPHRINE HYDROCHLORIDE 50 MCG: 10 INJECTION INTRAVENOUS at 10:33

## 2023-07-21 RX ADMIN — PHENYLEPHRINE HYDROCHLORIDE 25 MCG: 10 INJECTION INTRAVENOUS at 10:18

## 2023-07-21 RX ADMIN — LIDOCAINE HYDROCHLORIDE 20 MG: 10 INJECTION, SOLUTION INFILTRATION; PERINEURAL at 09:19

## 2023-07-21 RX ADMIN — MIDAZOLAM 2 MG: 1 INJECTION INTRAMUSCULAR; INTRAVENOUS at 09:12

## 2023-07-21 RX ADMIN — PROPOFOL 150 MG: 10 INJECTION, EMULSION INTRAVENOUS at 09:19

## 2023-07-21 RX ADMIN — ONDANSETRON 4 MG: 2 INJECTION INTRAMUSCULAR; INTRAVENOUS at 10:27

## 2023-07-21 RX ADMIN — FENTANYL CITRATE 100 MCG: 50 INJECTION, SOLUTION INTRAMUSCULAR; INTRAVENOUS at 08:06

## 2023-07-21 RX ADMIN — PHENYLEPHRINE HYDROCHLORIDE 25 MCG: 10 INJECTION INTRAVENOUS at 09:48

## 2023-07-21 RX ADMIN — PHENYLEPHRINE HYDROCHLORIDE 50 MCG: 10 INJECTION INTRAVENOUS at 10:30

## 2023-07-21 RX ADMIN — GLYCOPYRROLATE 0.1 MG: 0.2 INJECTION INTRAMUSCULAR; INTRAVENOUS at 09:55

## 2023-07-21 RX ADMIN — KETAMINE HYDROCHLORIDE 50 MG: 10 INJECTION, SOLUTION INTRAMUSCULAR; INTRAVENOUS at 09:43

## 2023-07-21 RX ADMIN — SODIUM CHLORIDE, POTASSIUM CHLORIDE, SODIUM LACTATE AND CALCIUM CHLORIDE: 600; 310; 30; 20 INJECTION, SOLUTION INTRAVENOUS at 07:58

## 2023-07-21 RX ADMIN — PHENYLEPHRINE HYDROCHLORIDE 25 MCG: 10 INJECTION INTRAVENOUS at 09:51

## 2023-07-21 RX ADMIN — DEXMEDETOMIDINE HYDROCHLORIDE 0.5 MCG/KG/HR: 100 INJECTION, SOLUTION INTRAVENOUS at 09:22

## 2023-07-21 RX ADMIN — GABAPENTIN 300 MG: 300 CAPSULE ORAL at 07:42

## 2023-07-21 RX ADMIN — Medication 2 G: at 09:12

## 2023-07-21 RX ADMIN — FENTANYL CITRATE 100 MCG: 50 INJECTION, SOLUTION INTRAMUSCULAR; INTRAVENOUS at 09:19

## 2023-07-21 RX ADMIN — PHENYLEPHRINE HYDROCHLORIDE 25 MCG: 10 INJECTION INTRAVENOUS at 09:55

## 2023-07-21 RX ADMIN — SUGAMMADEX 200 MG: 100 INJECTION, SOLUTION INTRAVENOUS at 10:45

## 2023-07-21 RX ADMIN — ROCURONIUM BROMIDE 50 MG: 10 INJECTION, SOLUTION INTRAVENOUS at 09:19

## 2023-07-21 RX ADMIN — DEXAMETHASONE SODIUM PHOSPHATE 4 MG: 4 INJECTION, SOLUTION INTRA-ARTICULAR; INTRALESIONAL; INTRAMUSCULAR; INTRAVENOUS; SOFT TISSUE at 09:30

## 2023-07-21 RX ADMIN — FENTANYL CITRATE 25 MCG: 50 INJECTION, SOLUTION INTRAMUSCULAR; INTRAVENOUS at 12:30

## 2023-07-21 RX ADMIN — PHENYLEPHRINE HYDROCHLORIDE 25 MCG: 10 INJECTION INTRAVENOUS at 09:54

## 2023-07-21 RX ADMIN — OXYCODONE HYDROCHLORIDE 5 MG: 5 TABLET ORAL at 11:40

## 2023-07-21 RX ADMIN — FENTANYL CITRATE 25 MCG: 50 INJECTION, SOLUTION INTRAMUSCULAR; INTRAVENOUS at 12:15

## 2023-07-21 ASSESSMENT — ACTIVITIES OF DAILY LIVING (ADL)
ADLS_ACUITY_SCORE: 35

## 2023-07-21 ASSESSMENT — ENCOUNTER SYMPTOMS: DYSRHYTHMIAS: 1

## 2023-07-21 NOTE — ADDENDUM NOTE
Addendum  created 07/21/23 1200 by Luli Hoffmann APRN CRNA    Clinical Note Signed, Intraprocedure Blocks edited

## 2023-07-21 NOTE — ANESTHESIA PREPROCEDURE EVALUATION
Anesthesia Pre-Procedure Evaluation    Patient: Emiliano Kelsey   MRN: [id number] : 1970        Procedure : Procedure(s):  RIGHT LUMBAR 4-5 DISCECTOMY          Past Medical History:   Diagnosis Date     Anomalous atrioventricular excitation     Created by Conversion      Regional enteritis of unspecified site     Created by Conversion       Past Surgical History:   Procedure Laterality Date     HC REMOVAL OF TONSILS,<15 Y/O      Description: Tonsillectomy;  Recorded: 2008;     ZZC APPENDECTOMY      Description: Appendectomy;  Recorded: 2008;     [facility] REPAIR CRUCIATE LIGAMENT,KNEE      Description: Primary Repair Of Knee Ligament Cruciate Anterior;  Recorded: 2008;      Allergies   Allergen Reactions     Codeine Unknown     Dyspepsia        Social History     Tobacco Use     Smoking status: Never     Passive exposure: Never     Smokeless tobacco: Never   Substance Use Topics     Alcohol use: Yes      Wt Readings from Last 1 Encounters:   23 80 kg (176 lb 6.4 oz)        Anesthesia Evaluation   Pt has had prior anesthetic. Type: General.    No history of anesthetic complications       ROS/MED HX  ENT/Pulmonary:  - neg pulmonary ROS     Neurologic:  - neg neurologic ROS     Cardiovascular:     (+) -----dysrhythmias, WPW,     METS/Exercise Tolerance: >4 METS    Hematologic:  - neg hematologic  ROS     Musculoskeletal:  - neg musculoskeletal ROS     GI/Hepatic:  - neg GI/hepatic ROS     Renal/Genitourinary:  - neg Renal ROS     Endo:  - neg endo ROS     Psychiatric/Substance Use:  - neg psychiatric ROS     Infectious Disease:       Malignancy:       Other:            Physical Exam    Airway        Mallampati: II   TM distance: > 3 FB   Neck ROM: full     Respiratory Devices and Support         Dental           Cardiovascular   cardiovascular exam normal          Pulmonary   pulmonary exam normal                OUTSIDE LABS:  CBC:   Lab Results   Component Value Date    WBC 6.5 2023     WBC 4.6 03/25/2021    HGB 12.4 (L) 07/11/2023    HGB 13.9 (L) 03/25/2021    HCT 36.6 (L) 07/11/2023    HCT 39.9 (L) 03/25/2021     07/11/2023     03/25/2021     BMP:   Lab Results   Component Value Date     07/11/2023     03/25/2021    POTASSIUM 4.2 07/11/2023    POTASSIUM 4.1 03/25/2021    CHLORIDE 99 07/11/2023    CHLORIDE 103 03/25/2021    CO2 28 07/11/2023    CO2 25 03/25/2021    BUN 26.9 (H) 07/11/2023    BUN 18 03/25/2021    CR 0.96 07/11/2023    CR 1.07 03/25/2021    GLC 92 07/11/2023     03/25/2021     COAGS: No results found for: PTT, INR, FIBR  POC: No results found for: BGM, HCG, HCGS  HEPATIC:   Lab Results   Component Value Date    ALBUMIN 4.6 03/25/2021    PROTTOTAL 7.4 03/25/2021    ALT 33 03/25/2021    AST 22 03/25/2021    ALKPHOS 76 03/25/2021    BILITOTAL 0.6 03/25/2021     OTHER:   Lab Results   Component Value Date    CHANTE 9.5 07/11/2023       Anesthesia Plan    ASA Status:  3      Anesthesia Type: General.     - Airway: ETT   Induction: Intravenous.           Consents    Anesthesia Plan(s) and associated risks, benefits, and realistic alternatives discussed. Questions answered and patient/representative(s) expressed understanding.     - Discussed: Risks, Benefits and Alternatives for BOTH SEDATION and the PROCEDURE were discussed     - Discussed with:  Patient         Postoperative Care    Pain management: Multi-modal analgesia.   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  Echo 2 years ago, recent ACG            Andra De Souza MD

## 2023-07-21 NOTE — DISCHARGE INSTRUCTIONS
Lumbar Spine Surgery Discharge Instructions    Your surgery was: Right L4-5 discectomy    Your surgeon is: Adalberto Alejandro MD    Restrictions after lumbar surgery:  - You should not do any excessive bending or twisting of your back beyond that needed to get in and out of a bed/chair, a car, or other similar daily activities.    - No lifting greater than 10 lbs (approximately what 1 gallon of milk weighs) until you are instructed that it is okay at your clinic follow-up visit.    - You should not drive a car or operate heavy machinery if you are taking prescribed narcotic pain medication    - Wound Care Instructions: Under your operative site dressing there are steri strips (small band-aids that go over the incision site). You can remove your dressing three days after surgery prior to your first time showering. Leave steri strips on until they fall off on their own or until you return to clinic for your post-operative appointment. It is okay to shower and get your wound wet, just do not let the water spray directly on your incision. Do not soak in a bathtub or swimming pool until you are told it is okay to do so when you return to clinic. The sutures are all buried under the skin and will dissolve on their own with time. If there is any drainage from the incision then you should place a new dry gauze dressing over it after a shower. If the incision and steri strips are dry then you can leave it without a dressing.    - Walking is your main physical therapy for now - we generally will not order PT unless it is determined at a later date in clinic that this is necessary. You should generally try to do at least short walks several times daily.    - If you are prescribed narcotic pain medications (Oxycodone, Norco, Percocet, Tylenol #3, Dilaudid, etc) then you should try to wean off of them as tolerated. These are AS NEEDED medications, so if you are not having significant pain you should try to take fewer pills at a time  or spread out the doses out over a longer period of time than is written on the prescription. As an example if you are prescribed 1-2 pills of pain medication every 4 hours AS NEEDED for pain, then you should begin taking only 1 pill every 4 hours as your pain tolerates. Then when 1 pill is giving good pain relief you should try to spread the doses out longer than 4 hours apart as you can tolerate it.    - You should avoid taking any more pain medications than is written on the prescription. In the event that you run out of the pills prior to when you should based on the written instructions, it is likely that your insurance provider will deny paying for a refill early.    - If your pain pill contains Tylenol (i.e. Percocet, Norco, Tylenol #3) and you are also taking additional Tylenol/acetaminophen as a pain medication, ensure that you are not taking too much tylenol. Prescription narcotic medications that contain Tylenol usually have 325mg of Tylenol per pill and over-the-counter medications have variable dose of Tylenol. You should not exceed 4,000mg of Tylenol in a 24-hour period.    Call the office if you have any questions/concerns or are experiencing the following:  - increasing drainage from the incision  - foul-smelling or malodorous drainage from the incision  - increasing pain not controlled by your prescribed medications  - inability to urinate  - new onset of weakness, numbness or severe pain in the extremities  - bowel/bladder incontinence  - Fever greater than 101.5 degrees  - Nausea/vomitting causing inability to eat food or medications    During normal business hours 7:30AM to 5:00PM on Monday-Friday you can reach my clinical assistant at [phone] and after hours you can reach the call-center for on-call physician at [phone]

## 2023-07-21 NOTE — ANESTHESIA POSTPROCEDURE EVALUATION
Patient: Emiliano Kelsey    Procedure: Procedure(s):  RIGHT LUMBAR 4-5 DISCECTOMY       Anesthesia Type:  General    Note:  Disposition: Outpatient   Postop Pain Control: Uneventful            Sign Out: Well controlled pain   PONV: No   Neuro/Psych: Uneventful            Sign Out: Acceptable/Baseline neuro status   Airway/Respiratory: Uneventful            Sign Out: Acceptable/Baseline resp. status   CV/Hemodynamics: Uneventful            Sign Out: Acceptable CV status; No obvious hypovolemia; No obvious fluid overload   Other NRE: NONE   DID A NON-ROUTINE EVENT OCCUR? No           Last vitals:  Vitals Value Taken Time   /69 07/21/23 1140   Temp     Pulse 71 07/21/23 1144   Resp 14 07/21/23 1140   SpO2 100 % 07/21/23 1144   Vitals shown include unvalidated device data.    Electronically Signed By: Andra De Souza MD  July 21, 2023  11:58 AM

## 2023-07-21 NOTE — PHARMACY-ADMISSION MEDICATION HISTORY
Pharmacist Admission Medication History    Admission medication history is complete. The information provided in this note is only as accurate as the sources available at the time of the update.    Medication reconciliation/reorder completed by provider prior to medication history? No    Information Source(s): Patient and CareEverywhere/SureScripts via in-person    Pertinent Information:     Changes made to PTA medication list:    Added: meloxicam    Deleted: ibuprofen    Changed: None    Allergies reviewed with patient and updates made in EHR: yes    Medication History Completed By: Praveena Larios RPH 7/21/2023 8:08 AM    Prior to Admission medications    Medication Sig Last Dose Taking? Auth Provider Long Term End Date   cyclobenzaprine (FLEXERIL) 5 MG tablet Take 1 tablet (5 mg) by mouth 3 times daily as needed for muscle spasms 0445 Yes Yohannes Bermudez, PANICK     meloxicam (MOBIC) 15 MG tablet Take 15 mg by mouth daily 7/7/2023 Yes Unknown, Entered By History Yes    traMADol (ULTRAM) 50 MG tablet Take 50 mg by mouth 3 times daily as needed for severe pain 7/21/2023 at 0445 Yes Reported, Patient

## 2023-07-21 NOTE — ANESTHESIA PROCEDURE NOTES
Airway       Patient location during procedure: OR       Procedure Start/Stop Times: 7/21/2023 9:21 AM  Staff -        Anesthesiologist:  Andra De Souza MD       CRNA: Luli Hoffmann APRN CRNA       Performed By: CRNA and anesthesiologistIndications and Patient Condition       Indications for airway management: salvador-procedural       Induction type:intravenous       Mask difficulty assessment: 1 - vent by mask    Final Airway Details       Final airway type: endotracheal airway       Successful airway: ETT - single  Endotracheal Airway Details        Cuffed: yes       Cuff volume (mL): 7       Successful intubation technique: direct laryngoscopy       DL Blade Type: Sanchez 2       Grade View of Cords: 2       Adjucts: stylet       Position: Right       Measured from: gums/teeth       Secured at (cm): 23       Bite block used: Soft    Post intubation assessment        Placement verified by: capnometry, equal breath sounds and chest rise        Number of attempts at approach: 1       Number of other approaches attempted: 0       Secured with: silk tape       Ease of procedure: easy (stiff neck, and slight anterior, large epiglottis)       Dentition: Intact and Unchanged       Dental guard used and removed. Dental Guard Type: Proguard Red.    Medication(s) Administered   Medication Administration Time: 7/21/2023 9:21 AM

## 2023-07-21 NOTE — ANESTHESIA CARE TRANSFER NOTE
Patient: Emiliano Kelsey    Procedure: Procedure(s):  RIGHT LUMBAR 4-5 DISCECTOMY       Diagnosis: Lumbar disc herniation [M51.26]  Diagnosis Additional Information: No value filed.    Anesthesia Type:   General     Note:    Oropharynx: oropharynx clear of all foreign objects  Level of Consciousness: awake  Oxygen Supplementation: face mask  Level of Supplemental Oxygen (L/min / FiO2): 6  Independent Airway: airway patency satisfactory and stable  Dentition: dentition unchanged  Vital Signs Stable: post-procedure vital signs reviewed and stable  Report to RN Given: handoff report given  Patient transferred to: PACU    Handoff Report: Identifed the Patient, Identified the Reponsible Provider, Reviewed the pertinent medical history, Discussed the surgical course, Reviewed Intra-OP anesthesia mangement and issues during anesthesia, Set expectations for post-procedure period and Allowed opportunity for questions and acknowledgement of understanding      Vitals:  Vitals Value Taken Time   /87 07/21/23 1107   Temp 99f    Pulse 78 07/21/23 1108   Resp 15 07/21/23 1108   SpO2 100 % 07/21/23 1108   Vitals shown include unvalidated device data.    Electronically Signed By: SOWMYA Arnett CRNA  July 21, 2023  11:10 AM

## 2023-07-21 NOTE — OP NOTE
Orthopedic  Operative Note    Pre-operative diagnosis: Lumbar disc herniation [M51.26]    Post-operative diagnosis: Right L4-5 disc extrusion with right lumbar radiculopathy    Procedure: 1) Right L4-5 discectomy   2) Use of intraoperative microscope    Surgeon: Adalberto Alejandro MD    Assistant(s): Dai Lala is an experienced first surgical assistant whose assistance was necessary for patient positioning, hemostasis, soft tissue and neural retraction, closure, and safe progression of surgery.    Anesthesia: General endotracheal anesthesia and Local anesthesia    Estimated blood loss: 50mL     Drains: None    Specimens: None    Indications:                               Emiliano is a pleasant 53yo M with intractable right lumbar radiculopathy for 6 weeks failure to respond to conservative cares. Patient has right paracentral disc extrusion at L4-5 to account for his symptoms. He elected to proceed with lumbar discectomy.    I discussed the risks of surgery with the the patient. There is the risk of general anesthesia, which is a risk for heart attach, stroke, respiratory compromise, death. There is a risk of all surgeries being bleeding or infection. In the case of spine surgery either of these could necessitate return to the OR in worst case scenario. There is a risk of recurrent disc herniation or failure to completely alleviate current symptoms. Also a risk of nerve root injury, temporary or permanent, which could cause pain, paresthesias/dysesthesias or weakness. A risk for dural tear with persistent CSF leak. Having had the discussion above, the patient appears to understand the risks, intended outcomes, postoperative course and agrees to proceed with surgery.     Findings: Right paracentral disc extrusion L4-5    Complications: None     Procedure Detail: The patient was seen in the preoperative holding area. The patient was again given the opportunity to ask questions regarding procedural detail,  risks and benefits, expected post-operative recovery. All questions were answered and informed consent was signed. The low back was marked with indelible ink at the anticipated level. The patient was then transferred back to the operative suite on a gurney. After satisfactory general anesthesia, the patient was carefully placed prone onto the Naresh frame. All bony prominences were well-padded. The back was prepped and draped in the usual sterile manner. Prior to incision, a critical pause was observed to identify the correct patient, correct procedure, correct level and that appropriate imaging studies were available. I also confirmed that the patient received appropriate pre-operative prophylactic antibiotics. The patient had received 2g Ancef. All in the room were in agreement.  An 18 gauge spinal needle and its trochar were placed through the skin at the anticipated level and a lateral C-arm imagine was taken to verify the starting point for the skin incision. A maker was used to kaden out the incision.     The skin and subcutaneous tissue was incised with 10-blade. Further dissection with electrocautery was used to reach the paraspinal fascia.  The fascia was incised on the right side of midline. Lopez and bovie used to subperiosteally dissect the paraspinal muscles off the right of the spine exposing the lamina. A metallic instrument was held up to the presumed right hemilamina of L4 and another lateral C-arm image taken to confirm this was correct. A high speed emperatriz was used to make a permanent kaden on the lamina.      Next, while using an operating microscope for visualization, a high emperatriz was then used to create a hemilaminotomy and to initiate a partial medial facetectomy on the right side. Kerrison rongeur was used to remove the ligamentum flavum and additional lamina. The majority of the facet joint and the pars is preserved for stability purposes. The traversing L5 nerve was identified and was retracted  medially with a nerve root retractor. In the subarticular recess there is contained a paracentral disc herniation. Annulotomy is made with a 15 blade. A ball-tipped probe was used to tease out the soft disc material and loose fragments. The traversing nerve and common dural tube was free to mobilize at this level now.  The disk space and epidural space was carefully irrigated and probed, finding no evidence of further impinging disk disease.  Hemostasis was achieved with Surgiflo and bipolar cautery and the wound was copiously irrigated again. 0.25% Marcaine was injected into the paraspinal muscles and subcutaneous tissue at the surgical site for post-operative pain relief. Total 50cc.  The wound was then closed carefully using #1 Vicryl suture in the paraspinal fascia, 2-0 Vicryl in the deep dermal layer and 3-0 stratafix in subcuticular layer.  Steri-Strips and sterile dressings are applied. All sponge and needle counts were correct in the end. The patient appeared to tolerate the procedure well and was escorted the recovery room in satisfactory condition.            Condition: Stable     Weight bearing status: Weight bearing as tolerated     Activity:        Anticoagulation plan:    Plan:      Adalberto Alejandro MD  [facility]  Date:  7/21/2023  10:47 AM   Activity as tolerated. Patient may move about with assist as indicated or with supervision. Avoid excessive bending/twisting. No lifting >10lbs    Ambulation and mechanical prophylaxis.    Discharge when Phase II criteria met. Periop antibiotics. Multimodal pain control. Follow up as scheduled 2-3 weeks post-surgery.

## 2023-08-10 ENCOUNTER — TRANSFERRED RECORDS (OUTPATIENT)
Dept: HEALTH INFORMATION MANAGEMENT | Facility: CLINIC | Age: 53
End: 2023-08-10
Payer: COMMERCIAL

## 2023-09-14 ENCOUNTER — TRANSFERRED RECORDS (OUTPATIENT)
Dept: HEALTH INFORMATION MANAGEMENT | Facility: CLINIC | Age: 53
End: 2023-09-14
Payer: COMMERCIAL

## 2023-09-16 ENCOUNTER — HEALTH MAINTENANCE LETTER (OUTPATIENT)
Age: 53
End: 2023-09-16

## 2023-11-16 ENCOUNTER — TRANSFERRED RECORDS (OUTPATIENT)
Dept: HEALTH INFORMATION MANAGEMENT | Facility: CLINIC | Age: 53
End: 2023-11-16
Payer: COMMERCIAL

## 2024-01-04 LAB
ATRIAL RATE - MUSE: 65 BPM
DIASTOLIC BLOOD PRESSURE - MUSE: NORMAL MMHG
INTERPRETATION ECG - MUSE: NORMAL
P AXIS - MUSE: 48 DEGREES
PR INTERVAL - MUSE: 140 MS
QRS DURATION - MUSE: 86 MS
QT - MUSE: 402 MS
QTC - MUSE: 418 MS
R AXIS - MUSE: 69 DEGREES
SYSTOLIC BLOOD PRESSURE - MUSE: NORMAL MMHG
T AXIS - MUSE: 49 DEGREES
VENTRICULAR RATE- MUSE: 65 BPM

## 2024-11-09 ENCOUNTER — HEALTH MAINTENANCE LETTER (OUTPATIENT)
Age: 54
End: 2024-11-09

## 2025-07-09 ENCOUNTER — OFFICE VISIT (OUTPATIENT)
Dept: URGENT CARE | Facility: URGENT CARE | Age: 55
End: 2025-07-09
Payer: COMMERCIAL

## 2025-07-09 VITALS
HEIGHT: 71 IN | HEART RATE: 87 BPM | OXYGEN SATURATION: 97 % | WEIGHT: 184 LBS | BODY MASS INDEX: 25.76 KG/M2 | TEMPERATURE: 98.3 F | SYSTOLIC BLOOD PRESSURE: 131 MMHG | RESPIRATION RATE: 16 BRPM | DIASTOLIC BLOOD PRESSURE: 81 MMHG

## 2025-07-09 DIAGNOSIS — B02.9 HERPES ZOSTER WITHOUT COMPLICATION: Primary | ICD-10-CM

## 2025-07-09 PROCEDURE — 3079F DIAST BP 80-89 MM HG: CPT

## 2025-07-09 PROCEDURE — 99213 OFFICE O/P EST LOW 20 MIN: CPT

## 2025-07-09 PROCEDURE — 3075F SYST BP GE 130 - 139MM HG: CPT

## 2025-07-09 RX ORDER — VALACYCLOVIR HYDROCHLORIDE 1 G/1
1000 TABLET, FILM COATED ORAL 3 TIMES DAILY
Qty: 21 TABLET | Refills: 0 | Status: SHIPPED | OUTPATIENT
Start: 2025-07-09 | End: 2025-07-16

## 2025-07-09 NOTE — PROGRESS NOTES
Urgent Care Clinic Visit    Chief Complaint   Patient presents with    Rash     Rash on back possible shingles

## 2025-07-09 NOTE — PATIENT INSTRUCTIONS
It does look like you have shingles on your back, we will treat this with an antiviral therapy. You may take tylenol/ibuprofen as needed for pain.

## 2025-07-09 NOTE — PROGRESS NOTES
"Patient presents with:  Rash: Rash on back possible shingles       Clinical Decision Making:      ICD-10-CM    1. Herpes zoster without complication  B02.9 valACYclovir (VALTREX) 1000 mg tablet        Patient with herpes zoster to his back, Valtrex prescribed.  Also recommend Tylenol/ibuprofen as needed for pain.  Patient instructions as below. Discussed red flag symptoms for when to return.       Patient Instructions   It does look like you have shingles on your back, we will treat this with an antiviral therapy. You may take tylenol/ibuprofen as needed for pain.     HPI:  Emiliano Kelsey is a 54 year old male who presents today with concerns of rash on back. Started to have headache and fatigue about 5 days ago but didn't notice rash until yesterday. It stings and is painful.     History obtained from the patient.    Problem List:  2023-07: Right sided abdominal pain  2021-04: Duodenitis  2021-04: Epigastric pain  2021-04: Nonspecific abdominal pain  2009-03: Crohn's disease (H)  2009-03: Diverticular disease of colon  Opal-Parkinson-White Syndrome  Crohn's Disease  Allergies      Past Medical History:   Diagnosis Date    Anomalous atrioventricular excitation     Created by Conversion     Regional enteritis of unspecified site     Created by Conversion        Social History     Tobacco Use    Smoking status: Never     Passive exposure: Never    Smokeless tobacco: Never   Substance Use Topics    Alcohol use: Yes       ROS is negative other than what is noted in HPI.       Vitals:    07/09/25 1100   BP: 131/81   Pulse: 87   Resp: 16   Temp: 98.3  F (36.8  C)   TempSrc: Oral   SpO2: 97%   Weight: 83.5 kg (184 lb)   Height: 1.803 m (5' 11\")       Physical Exam  Constitutional:       General: He is not in acute distress.     Appearance: He is not diaphoretic.   HENT:      Head: Normocephalic and atraumatic.      Right Ear: External ear normal.      Left Ear: External ear normal.   Eyes:      Conjunctiva/sclera: " Conjunctivae normal.   Cardiovascular:      Rate and Rhythm: Normal rate and regular rhythm.   Pulmonary:      Effort: Pulmonary effort is normal. No respiratory distress.   Musculoskeletal:         General: Normal range of motion.   Skin:     General: Skin is warm.      Findings: Rash present.      Comments: Rash to right lower back as shown in image below.    Neurological:      General: No focal deficit present.      Mental Status: He is alert.   Psychiatric:         Mood and Affect: Mood normal.         Thought Content: Thought content normal.         Judgment: Judgment normal.           At the end of the encounter, I discussed results, diagnosis, medications. Discussed red flags for immediate return to clinic/ER, as well as indications for follow up if no improvement. Patient understood and agreed to plan. Patient was stable for discharge.    SOWMYA Lam [facility]

## 2025-08-01 ENCOUNTER — TRANSFERRED RECORDS (OUTPATIENT)
Dept: HEALTH INFORMATION MANAGEMENT | Facility: CLINIC | Age: 55
End: 2025-08-01
Payer: COMMERCIAL

## (undated) DEVICE — Device

## (undated) DEVICE — SOL NACL 0.9% IRRIG 1000ML BOTTLE 2F7124

## (undated) DEVICE — BONE WAX 2.5GM W31G

## (undated) DEVICE — CATH IV 14GA 2IN REM FLASHPLUG DEHP-FR PVC FR 4251717-02

## (undated) DEVICE — CUSTOM PACK LUMBAR FUSION SNE5BLFHEA

## (undated) DEVICE — SUTURE VICRYL+ 1 18 CT/CR  VLT VCP753D

## (undated) DEVICE — SUCTION TIP YANKAUER W/O VENT K86

## (undated) DEVICE — WRAP LUMBAR COMPRESS COLD THERAPY 4632P

## (undated) DEVICE — PACK COLD 6 X 10 1-HOUR 0814-0610

## (undated) DEVICE — SOL WATER IRRIG 1000ML BOTTLE 2F7114

## (undated) DEVICE — POSITIONER ARM CRADLE LAMINECTOMY DISP

## (undated) DEVICE — TOOL DISSECT MIDAS MR8 14CM MATCH HEAD 3MM MR8-14MH30

## (undated) DEVICE — TIP SUCTION FRAIZER 10FR DISP 163

## (undated) DEVICE — DRAPE STERI U 1015

## (undated) DEVICE — GLOVE BIOGEL PI INDICATOR 8.0 LF 41680

## (undated) DEVICE — ESU PENCIL SMOKE EVAC W/ROCKER SWITCH 0703-047-000

## (undated) DEVICE — SOL ADH LIQUID BENZOIN SWAB 0.6ML C1544

## (undated) DEVICE — ESU ELEC EDGE INSULATED BLADE 4" E1455-4

## (undated) DEVICE — DRSG PRIMAPORE 03 1/8X6" 66000318

## (undated) DEVICE — DECANTER VIAL 2006S

## (undated) DEVICE — RX SURGIFLO HEMOSTATIC MATRIX 8ML 2991

## (undated) DEVICE — GLOVE BIOGEL PI SZ 7.5 40875

## (undated) DEVICE — SUTURE VICRYL+ 2-0 CT-2 27" UND VCP269H

## (undated) DEVICE — DRAPE C-ARM 60X42" 1013

## (undated) DEVICE — PLATE GROUNDING ADULT W/CORD 9165L

## (undated) DEVICE — SU STRATAFIX MONOCRYL 3-0 SPIRAL PS-2 30CM SXMP1B106

## (undated) DEVICE — ESU ELEC BLADE 2.75" COATED/INSULATED E1455

## (undated) DEVICE — CUSHION INSERT LG PRONE VIEW JACKSON TABLE

## (undated) DEVICE — DRSG STERI STRIP 1/2X4" R1547

## (undated) RX ORDER — DEXAMETHASONE SODIUM PHOSPHATE 4 MG/ML
INJECTION, SOLUTION INTRA-ARTICULAR; INTRALESIONAL; INTRAMUSCULAR; INTRAVENOUS; SOFT TISSUE
Status: DISPENSED
Start: 2023-07-21

## (undated) RX ORDER — FENTANYL CITRATE 50 UG/ML
INJECTION, SOLUTION INTRAMUSCULAR; INTRAVENOUS
Status: DISPENSED
Start: 2023-07-21

## (undated) RX ORDER — FENTANYL CITRATE-0.9 % NACL/PF 10 MCG/ML
PLASTIC BAG, INJECTION (ML) INTRAVENOUS
Status: DISPENSED
Start: 2023-07-21